# Patient Record
Sex: MALE | Employment: OTHER | URBAN - METROPOLITAN AREA
[De-identification: names, ages, dates, MRNs, and addresses within clinical notes are randomized per-mention and may not be internally consistent; named-entity substitution may affect disease eponyms.]

---

## 2018-06-14 ENCOUNTER — HOSPITAL ENCOUNTER (OUTPATIENT)
Age: 78
Discharge: HOME | End: 2018-06-16
Payer: MEDICARE

## 2018-06-14 PROCEDURE — 87077 CULTURE AEROBIC IDENTIFY: CPT

## 2018-06-14 PROCEDURE — 87075 CULTR BACTERIA EXCEPT BLOOD: CPT

## 2018-06-14 PROCEDURE — 87186 SC STD MICRODIL/AGAR DIL: CPT

## 2018-06-14 PROCEDURE — 87070 CULTURE OTHR SPECIMN AEROBIC: CPT

## 2018-06-16 LAB — ANAEROBIC CULTURE: NORMAL

## 2018-06-18 LAB
ORGANISM: ABNORMAL
ORGANISM: ABNORMAL
WOUND/ABSCESS: ABNORMAL

## 2023-03-06 PROBLEM — I10 HYPERTENSION: Status: ACTIVE | Noted: 2023-03-06

## 2023-03-06 PROBLEM — M16.10 HIP ARTHRITIS: Status: ACTIVE | Noted: 2023-03-06

## 2023-03-06 PROBLEM — M65.331 TRIGGER MIDDLE FINGER OF RIGHT HAND: Status: ACTIVE | Noted: 2023-03-06

## 2023-03-06 PROBLEM — M25.559 JOINT PAIN, HIP: Status: ACTIVE | Noted: 2023-03-06

## 2023-03-06 PROBLEM — H11.32 SUBCONJUNCTIVAL HEMORRHAGE OF LEFT EYE: Status: ACTIVE | Noted: 2023-03-06

## 2023-03-06 PROBLEM — E66.9 OBESITY: Status: ACTIVE | Noted: 2023-03-06

## 2023-03-06 PROBLEM — K21.9 GERD (GASTROESOPHAGEAL REFLUX DISEASE): Status: ACTIVE | Noted: 2023-03-06

## 2023-03-06 PROBLEM — R39.9 URINARY SYMPTOM OR SIGN: Status: ACTIVE | Noted: 2023-03-06

## 2023-03-06 PROBLEM — M54.50 BACK PAIN, LUMBOSACRAL: Status: ACTIVE | Noted: 2023-03-06

## 2023-03-06 PROBLEM — K21.00 REFLUX ESOPHAGITIS: Status: ACTIVE | Noted: 2023-03-06

## 2023-03-06 PROBLEM — C18.9 ADENOCARCINOMA, COLON (MULTI): Status: ACTIVE | Noted: 2023-03-06

## 2023-03-06 PROBLEM — G47.33 OBSTRUCTIVE SLEEP APNEA: Status: ACTIVE | Noted: 2023-03-06

## 2023-03-06 PROBLEM — Z98.890 HISTORY OF CARPAL TUNNEL SURGERY: Status: ACTIVE | Noted: 2023-03-06

## 2023-03-06 PROBLEM — K76.0 NONALCOHOLIC FATTY LIVER DISEASE: Status: ACTIVE | Noted: 2023-03-06

## 2023-03-06 PROBLEM — M80.051D: Status: ACTIVE | Noted: 2023-03-06

## 2023-03-06 PROBLEM — R06.02 SHORTNESS OF BREATH: Status: ACTIVE | Noted: 2023-03-06

## 2023-03-06 PROBLEM — E66.3 OVERWEIGHT WITH BODY MASS INDEX (BMI) OF 28 TO 28.9 IN ADULT: Status: ACTIVE | Noted: 2023-03-06

## 2023-03-06 PROBLEM — L60.0 INGROWN NAIL OF GREAT TOE OF RIGHT FOOT: Status: ACTIVE | Noted: 2023-03-06

## 2023-03-06 PROBLEM — K83.01 PRIMARY SCLEROSING CHOLANGITIS (CMS-HCC): Status: ACTIVE | Noted: 2023-03-06

## 2023-03-06 PROBLEM — G56.01 CARPAL TUNNEL SYNDROME OF RIGHT WRIST: Status: ACTIVE | Noted: 2023-03-06

## 2023-03-06 PROBLEM — M54.12 CERVICAL RADICULOPATHY: Status: ACTIVE | Noted: 2023-03-06

## 2023-03-06 PROBLEM — Z85.038 PERSONAL HISTORY OF COLON CANCER: Status: ACTIVE | Noted: 2023-03-06

## 2023-03-06 PROBLEM — K51.90 ULCERATIVE COLITIS (MULTI): Status: ACTIVE | Noted: 2023-03-06

## 2023-03-06 PROBLEM — I49.3 PREMATURE VENTRICULAR CONTRACTIONS: Status: ACTIVE | Noted: 2023-03-06

## 2023-03-06 PROBLEM — D64.9 ANEMIA: Status: ACTIVE | Noted: 2023-03-06

## 2023-03-06 PROBLEM — M10.9 GOUT: Status: ACTIVE | Noted: 2023-03-06

## 2023-03-06 PROBLEM — K74.00 FIBROSIS OF LIVER: Status: ACTIVE | Noted: 2023-03-06

## 2023-03-06 PROBLEM — J32.9 CHRONIC SINUSITIS: Status: ACTIVE | Noted: 2023-03-06

## 2023-03-06 PROBLEM — G62.9 NEUROPATHY: Status: ACTIVE | Noted: 2023-03-06

## 2023-03-06 PROBLEM — I50.22 CHRONIC SYSTOLIC CONGESTIVE HEART FAILURE (MULTI): Status: ACTIVE | Noted: 2023-03-06

## 2023-03-06 PROBLEM — M53.3 SACRAL PAIN: Status: ACTIVE | Noted: 2023-03-06

## 2023-03-06 PROBLEM — E83.42 HYPOMAGNESEMIA: Status: ACTIVE | Noted: 2023-03-06

## 2023-03-06 PROBLEM — R42 DIZZINESS: Status: ACTIVE | Noted: 2023-03-06

## 2023-03-06 PROBLEM — M17.0 DEGENERATIVE ARTHRITIS OF KNEE, BILATERAL: Status: ACTIVE | Noted: 2023-03-06

## 2023-03-06 PROBLEM — I48.91 ATRIAL FIBRILLATION (MULTI): Status: ACTIVE | Noted: 2023-03-06

## 2023-03-06 PROBLEM — I48.92 ATRIAL FLUTTER (MULTI): Status: ACTIVE | Noted: 2023-03-06

## 2023-03-06 PROBLEM — M79.671 RIGHT FOOT PAIN: Status: ACTIVE | Noted: 2023-03-06

## 2023-03-06 RX ORDER — OMEPRAZOLE 20 MG/1
1 CAPSULE, DELAYED RELEASE ORAL DAILY
COMMUNITY
Start: 2020-07-08 | End: 2023-05-05 | Stop reason: SDUPTHER

## 2023-03-06 RX ORDER — SPIRONOLACTONE 25 MG/1
0.5 TABLET ORAL DAILY
COMMUNITY
Start: 2015-09-01 | End: 2024-04-23 | Stop reason: SDUPTHER

## 2023-03-06 RX ORDER — LISINOPRIL 20 MG/1
1 TABLET ORAL DAILY
COMMUNITY
Start: 2012-09-17 | End: 2024-01-29 | Stop reason: SDUPTHER

## 2023-03-06 RX ORDER — METOPROLOL SUCCINATE 100 MG/1
1 TABLET, EXTENDED RELEASE ORAL DAILY
COMMUNITY
Start: 2016-03-17 | End: 2023-10-27 | Stop reason: SDUPTHER

## 2023-03-06 RX ORDER — DORZOLAMIDE HCL 20 MG/ML
1 SOLUTION/ DROPS OPHTHALMIC 2 TIMES DAILY
COMMUNITY
Start: 2021-08-13

## 2023-03-06 RX ORDER — ALLOPURINOL 100 MG/1
1 TABLET ORAL
COMMUNITY
Start: 2022-02-22 | End: 2024-01-03 | Stop reason: SDUPTHER

## 2023-03-06 RX ORDER — PROBENECID AND COLCHICINE .5; 5 MG/1; MG/1
1 TABLET ORAL DAILY
COMMUNITY
Start: 2013-05-13 | End: 2023-03-08 | Stop reason: SDUPTHER

## 2023-03-06 RX ORDER — MESALAMINE 0.38 G/1
4 CAPSULE, EXTENDED RELEASE ORAL EVERY MORNING
COMMUNITY
Start: 2016-04-27

## 2023-03-08 ENCOUNTER — OFFICE VISIT (OUTPATIENT)
Dept: PRIMARY CARE | Facility: CLINIC | Age: 83
End: 2023-03-08
Payer: COMMERCIAL

## 2023-03-08 VITALS
BODY MASS INDEX: 27.03 KG/M2 | OXYGEN SATURATION: 98 % | HEART RATE: 52 BPM | TEMPERATURE: 97.3 F | DIASTOLIC BLOOD PRESSURE: 62 MMHG | WEIGHT: 172.6 LBS | SYSTOLIC BLOOD PRESSURE: 120 MMHG

## 2023-03-08 DIAGNOSIS — E66.3 OVERWEIGHT WITH BODY MASS INDEX (BMI) OF 28 TO 28.9 IN ADULT: ICD-10-CM

## 2023-03-08 DIAGNOSIS — Z00.00 MEDICARE ANNUAL WELLNESS VISIT, SUBSEQUENT: ICD-10-CM

## 2023-03-08 DIAGNOSIS — D64.9 ANEMIA, UNSPECIFIED TYPE: ICD-10-CM

## 2023-03-08 DIAGNOSIS — M54.50 BACK PAIN, LUMBOSACRAL: Primary | ICD-10-CM

## 2023-03-08 DIAGNOSIS — R31.9 HEMATURIA, UNSPECIFIED TYPE: ICD-10-CM

## 2023-03-08 DIAGNOSIS — Z00.00 ROUTINE GENERAL MEDICAL EXAMINATION AT HEALTH CARE FACILITY: ICD-10-CM

## 2023-03-08 DIAGNOSIS — K21.9 GASTROESOPHAGEAL REFLUX DISEASE WITHOUT ESOPHAGITIS: ICD-10-CM

## 2023-03-08 DIAGNOSIS — T56.0X1D LEAD-INDUCED ACUTE GOUT, UNSPECIFIED SITE, SUBSEQUENT ENCOUNTER: ICD-10-CM

## 2023-03-08 DIAGNOSIS — C18.9 ADENOCARCINOMA, COLON (MULTI): ICD-10-CM

## 2023-03-08 DIAGNOSIS — M10.10 LEAD-INDUCED ACUTE GOUT, UNSPECIFIED SITE, SUBSEQUENT ENCOUNTER: ICD-10-CM

## 2023-03-08 PROBLEM — M53.3 SACRAL PAIN: Status: RESOLVED | Noted: 2023-03-06 | Resolved: 2023-03-08

## 2023-03-08 PROCEDURE — G0444 DEPRESSION SCREEN ANNUAL: HCPCS | Performed by: INTERNAL MEDICINE

## 2023-03-08 PROCEDURE — 1160F RVW MEDS BY RX/DR IN RCRD: CPT | Performed by: INTERNAL MEDICINE

## 2023-03-08 PROCEDURE — 1036F TOBACCO NON-USER: CPT | Performed by: INTERNAL MEDICINE

## 2023-03-08 PROCEDURE — G0439 PPPS, SUBSEQ VISIT: HCPCS | Performed by: INTERNAL MEDICINE

## 2023-03-08 PROCEDURE — 99214 OFFICE O/P EST MOD 30 MIN: CPT | Performed by: INTERNAL MEDICINE

## 2023-03-08 PROCEDURE — 3078F DIAST BP <80 MM HG: CPT | Performed by: INTERNAL MEDICINE

## 2023-03-08 PROCEDURE — 3074F SYST BP LT 130 MM HG: CPT | Performed by: INTERNAL MEDICINE

## 2023-03-08 PROCEDURE — 1170F FXNL STATUS ASSESSED: CPT | Performed by: INTERNAL MEDICINE

## 2023-03-08 PROCEDURE — 1159F MED LIST DOCD IN RCRD: CPT | Performed by: INTERNAL MEDICINE

## 2023-03-08 RX ORDER — PROBENECID AND COLCHICINE .5; 5 MG/1; MG/1
1 TABLET ORAL DAILY
Qty: 90 TABLET | Refills: 1 | Status: SHIPPED | OUTPATIENT
Start: 2023-03-08

## 2023-03-08 ASSESSMENT — ENCOUNTER SYMPTOMS
COUGH: 0
BRUISES/BLEEDS EASILY: 0
ARTHRALGIAS: 0
PALPITATIONS: 0
DIZZINESS: 0
SINUS PAIN: 0
FEVER: 0
SORE THROAT: 0
ABDOMINAL PAIN: 0
UNEXPECTED WEIGHT CHANGE: 0
BLOOD IN STOOL: 0
FATIGUE: 0
HEADACHES: 0
DEPRESSION: 1
DIFFICULTY URINATING: 0
LOSS OF SENSATION IN FEET: 1
DIARRHEA: 0
OCCASIONAL FEELINGS OF UNSTEADINESS: 1
DYSURIA: 1
WHEEZING: 0

## 2023-03-08 ASSESSMENT — ACTIVITIES OF DAILY LIVING (ADL)
MANAGING_FINANCES: NEEDS ASSISTANCE
DOING_HOUSEWORK: NEEDS ASSISTANCE
TAKING_MEDICATION: INDEPENDENT
GROCERY_SHOPPING: NEEDS ASSISTANCE
BATHING: INDEPENDENT
DRESSING: INDEPENDENT

## 2023-03-08 ASSESSMENT — PATIENT HEALTH QUESTIONNAIRE - PHQ9
2. FEELING DOWN, DEPRESSED OR HOPELESS: MORE THAN HALF THE DAYS
3. TROUBLE FALLING OR STAYING ASLEEP OR SLEEPING TOO MUCH: NOT AT ALL
9. THOUGHTS THAT YOU WOULD BE BETTER OFF DEAD, OR OF HURTING YOURSELF: NOT AT ALL
1. LITTLE INTEREST OR PLEASURE IN DOING THINGS: MORE THAN HALF THE DAYS
5. POOR APPETITE OR OVEREATING: NEARLY EVERY DAY
8. MOVING OR SPEAKING SO SLOWLY THAT OTHER PEOPLE COULD HAVE NOTICED. OR THE OPPOSITE, BEING SO FIGETY OR RESTLESS THAT YOU HAVE BEEN MOVING AROUND A LOT MORE THAN USUAL: NOT AT ALL
SUM OF ALL RESPONSES TO PHQ9 QUESTIONS 1 AND 2: 4
SUM OF ALL RESPONSES TO PHQ QUESTIONS 1-9: 8
7. TROUBLE CONCENTRATING ON THINGS, SUCH AS READING THE NEWSPAPER OR WATCHING TELEVISION: NOT AT ALL
6. FEELING BAD ABOUT YOURSELF - OR THAT YOU ARE A FAILURE OR HAVE LET YOURSELF OR YOUR FAMILY DOWN: NOT AT ALL
4. FEELING TIRED OR HAVING LITTLE ENERGY: SEVERAL DAYS

## 2023-03-08 NOTE — PROGRESS NOTES
Subjective   Reason for Visit: Guero Boykin is an 82 y.o. male here for a Medicare Wellness visit.          Reviewed all medications by prescribing practitioner or clinical pharmacist (such as prescriptions, OTCs, herbal therapies and supplements) and documented in the medical record.    -I spent 15 minutes obtaining and discussing depression screening using PHQ-2 questions with results documented in the chart.  -Urinary symptoms possible old hematuria need to obtain urine analysis and culture follow-up results closely        Patient Self Assessment of Health Status  Patient Self Assessment: Fair    Nutrition and Exercise  Current Diet: Unhealthy Diet  Adequate Fluid Intake: No  Caffeine: Yes  Exercise Frequency: Infrequently    Functional Ability/Level of Safety  Cognitive Impairment Observed: No cognitive impairment observed  Cognitive Impairment Reported: No cognitive impairment reported by patient or family    Home Safety Risk Factors: None    Patient Care Team:  Margaret Barr MD as PCP - General  Margaret Barr MD as PCP - [employer] PCP     Review of Systems   Constitutional:  Negative for fatigue, fever and unexpected weight change.   HENT:  Negative for congestion, ear discharge, ear pain, mouth sores, sinus pain and sore throat.    Eyes:  Negative for visual disturbance.   Respiratory:  Negative for cough and wheezing.    Cardiovascular:  Negative for chest pain, palpitations and leg swelling.   Gastrointestinal:  Negative for abdominal pain, blood in stool and diarrhea.   Genitourinary:  Positive for dysuria. Negative for difficulty urinating.   Musculoskeletal:  Negative for arthralgias.   Skin:  Negative for rash.   Neurological:  Negative for dizziness and headaches.   Hematological:  Does not bruise/bleed easily.   Psychiatric/Behavioral:  Negative for behavioral problems.        Objective   Vitals:  /62   Pulse 52   Temp 36.3 °C (97.3 °F)   Wt 78.3 kg (172 lb 9.6 oz)    SpO2 98%   BMI 27.03 kg/m²       Physical Exam  Constitutional:       Appearance: Normal appearance.   HENT:      Head: Normocephalic.      Nose: Nose normal.   Eyes:      Conjunctiva/sclera: Conjunctivae normal.      Pupils: Pupils are equal, round, and reactive to light.   Cardiovascular:      Rate and Rhythm: Regular rhythm.   Pulmonary:      Effort: Pulmonary effort is normal.      Breath sounds: Normal breath sounds.   Abdominal:      General: Abdomen is flat.      Palpations: Abdomen is soft.   Musculoskeletal:      Cervical back: Neck supple.   Skin:     General: Skin is warm.   Neurological:      General: No focal deficit present.      Mental Status: He is alert and oriented to person, place, and time.   Psychiatric:         Mood and Affect: Mood normal.         Assessment/Plan   Problem List Items Addressed This Visit          Digestive    Adenocarcinoma, colon (CMS/HCC)    Current Assessment & Plan     In remission no recurrence         GERD (gastroesophageal reflux disease)       Endocrine/Metabolic    Overweight with body mass index (BMI) of 28 to 28.9 in adult    Current Assessment & Plan     Counseled about obesity low-carb diet increase activity            Hematologic    Anemia    Current Assessment & Plan     Compensated follow-up CBC continue with multivitamins            Other    Gout    Relevant Medications    probenecid-colchicine 500-0.5 mg tablet    Back pain, lumbosacral - Primary    Current Assessment & Plan     Continue Tylenol as needed increase activity, physical therapy          Other Visit Diagnoses       Hematuria, unspecified type        Relevant Orders    Urinalysis Microscopic Only    Urine Culture    Medicare annual wellness visit, subsequent        Routine general medical examination at health care facility              -I spent 15 minutes obtaining and discussing depression screening using PHQ-2 questions with results documented in the chart.  -Urinary symptoms possible old  hematuria need to obtain urine analysis and culture follow-up results closely

## 2023-03-09 ENCOUNTER — LAB (OUTPATIENT)
Dept: LAB | Facility: LAB | Age: 83
End: 2023-03-09
Payer: COMMERCIAL

## 2023-03-09 DIAGNOSIS — R31.9 HEMATURIA, UNSPECIFIED TYPE: ICD-10-CM

## 2023-03-09 DIAGNOSIS — R31.9 URINARY TRACT INFECTION WITH HEMATURIA, SITE UNSPECIFIED: Primary | ICD-10-CM

## 2023-03-09 DIAGNOSIS — N39.0 URINARY TRACT INFECTION WITH HEMATURIA, SITE UNSPECIFIED: Primary | ICD-10-CM

## 2023-03-09 PROCEDURE — 81001 URINALYSIS AUTO W/SCOPE: CPT

## 2023-03-09 PROCEDURE — 87086 URINE CULTURE/COLONY COUNT: CPT

## 2023-03-09 PROCEDURE — 87077 CULTURE AEROBIC IDENTIFY: CPT

## 2023-03-09 PROCEDURE — 87186 SC STD MICRODIL/AGAR DIL: CPT

## 2023-03-10 LAB
BACTERIA, URINE: ABNORMAL /HPF
HYALINE CASTS, URINE: ABNORMAL /LPF
MUCUS, URINE: ABNORMAL /LPF
RBC, URINE: 5 /HPF (ref 0–5)
SQUAMOUS EPITHELIAL CELLS, URINE: <1 /HPF
WBC CLUMPS, URINE: ABNORMAL /HPF
WBC, URINE: 78 /HPF (ref 0–5)

## 2023-03-12 LAB — URINE CULTURE: ABNORMAL

## 2023-03-12 RX ORDER — AMOXICILLIN AND CLAVULANATE POTASSIUM 875; 125 MG/1; MG/1
875 TABLET, FILM COATED ORAL
Qty: 20 TABLET | Refills: 0 | Status: SHIPPED | OUTPATIENT
Start: 2023-03-12 | End: 2023-03-22

## 2023-05-05 DIAGNOSIS — K21.9 GASTROESOPHAGEAL REFLUX DISEASE WITHOUT ESOPHAGITIS: ICD-10-CM

## 2023-05-07 RX ORDER — OMEPRAZOLE 20 MG/1
20 CAPSULE, DELAYED RELEASE ORAL DAILY
Qty: 30 CAPSULE | Refills: 0 | Status: SHIPPED | OUTPATIENT
Start: 2023-05-07 | End: 2023-08-23 | Stop reason: SDUPTHER

## 2023-06-08 ENCOUNTER — OFFICE VISIT (OUTPATIENT)
Dept: PRIMARY CARE | Facility: CLINIC | Age: 83
End: 2023-06-08
Payer: COMMERCIAL

## 2023-06-08 VITALS
WEIGHT: 168.6 LBS | DIASTOLIC BLOOD PRESSURE: 84 MMHG | OXYGEN SATURATION: 100 % | BODY MASS INDEX: 27.1 KG/M2 | HEART RATE: 83 BPM | TEMPERATURE: 97 F | HEIGHT: 66 IN | SYSTOLIC BLOOD PRESSURE: 132 MMHG

## 2023-06-08 DIAGNOSIS — I48.92 ATRIAL FLUTTER, UNSPECIFIED TYPE (MULTI): ICD-10-CM

## 2023-06-08 DIAGNOSIS — Z00.00 ROUTINE GENERAL MEDICAL EXAMINATION AT HEALTH CARE FACILITY: ICD-10-CM

## 2023-06-08 DIAGNOSIS — R06.02 SHORTNESS OF BREATH: ICD-10-CM

## 2023-06-08 DIAGNOSIS — E55.9 VITAMIN D DEFICIENCY, UNSPECIFIED: ICD-10-CM

## 2023-06-08 DIAGNOSIS — E78.00 HYPERCHOLESTEREMIA: ICD-10-CM

## 2023-06-08 DIAGNOSIS — K51.219 ULCERATIVE PROCTITIS WITH COMPLICATION (MULTI): ICD-10-CM

## 2023-06-08 DIAGNOSIS — K21.00 GASTROESOPHAGEAL REFLUX DISEASE WITH ESOPHAGITIS WITHOUT HEMORRHAGE: ICD-10-CM

## 2023-06-08 DIAGNOSIS — F10.20 UNCOMPLICATED ALCOHOL DEPENDENCE (MULTI): ICD-10-CM

## 2023-06-08 DIAGNOSIS — I48.11 LONGSTANDING PERSISTENT ATRIAL FIBRILLATION (MULTI): ICD-10-CM

## 2023-06-08 DIAGNOSIS — I10 HYPERTENSION, UNSPECIFIED TYPE: Primary | ICD-10-CM

## 2023-06-08 DIAGNOSIS — I50.22 CHRONIC SYSTOLIC CONGESTIVE HEART FAILURE (MULTI): ICD-10-CM

## 2023-06-08 DIAGNOSIS — Z13.89 SCREENING FOR MULTIPLE CONDITIONS: ICD-10-CM

## 2023-06-08 DIAGNOSIS — E53.8 VITAMIN B12 DEFICIENCY: ICD-10-CM

## 2023-06-08 DIAGNOSIS — R53.83 OTHER FATIGUE: ICD-10-CM

## 2023-06-08 DIAGNOSIS — Z79.899 HIGH RISK MEDICATION USE: ICD-10-CM

## 2023-06-08 PROBLEM — Z98.890 HISTORY OF CARPAL TUNNEL SURGERY: Status: RESOLVED | Noted: 2023-03-06 | Resolved: 2023-06-08

## 2023-06-08 PROBLEM — G62.9 NEUROPATHY: Status: RESOLVED | Noted: 2023-03-06 | Resolved: 2023-06-08

## 2023-06-08 PROBLEM — M65.331 TRIGGER MIDDLE FINGER OF RIGHT HAND: Status: RESOLVED | Noted: 2023-03-06 | Resolved: 2023-06-08

## 2023-06-08 PROBLEM — E66.9 OBESITY: Status: RESOLVED | Noted: 2023-03-06 | Resolved: 2023-06-08

## 2023-06-08 PROBLEM — L60.0 INGROWN NAIL OF GREAT TOE OF RIGHT FOOT: Status: RESOLVED | Noted: 2023-03-06 | Resolved: 2023-06-08

## 2023-06-08 PROBLEM — M80.051D: Status: RESOLVED | Noted: 2023-03-06 | Resolved: 2023-06-08

## 2023-06-08 PROBLEM — K83.01 PRIMARY SCLEROSING CHOLANGITIS (CMS-HCC): Status: RESOLVED | Noted: 2023-03-06 | Resolved: 2023-06-08

## 2023-06-08 PROBLEM — M79.671 RIGHT FOOT PAIN: Status: RESOLVED | Noted: 2023-03-06 | Resolved: 2023-06-08

## 2023-06-08 PROBLEM — M25.559 JOINT PAIN, HIP: Status: RESOLVED | Noted: 2023-03-06 | Resolved: 2023-06-08

## 2023-06-08 PROBLEM — K76.0 NONALCOHOLIC FATTY LIVER DISEASE: Status: RESOLVED | Noted: 2023-03-06 | Resolved: 2023-06-08

## 2023-06-08 PROCEDURE — 1159F MED LIST DOCD IN RCRD: CPT | Performed by: INTERNAL MEDICINE

## 2023-06-08 PROCEDURE — 1170F FXNL STATUS ASSESSED: CPT | Performed by: INTERNAL MEDICINE

## 2023-06-08 PROCEDURE — G0442 ANNUAL ALCOHOL SCREEN 15 MIN: HCPCS | Performed by: INTERNAL MEDICINE

## 2023-06-08 PROCEDURE — 1160F RVW MEDS BY RX/DR IN RCRD: CPT | Performed by: INTERNAL MEDICINE

## 2023-06-08 PROCEDURE — 3075F SYST BP GE 130 - 139MM HG: CPT | Performed by: INTERNAL MEDICINE

## 2023-06-08 PROCEDURE — 99397 PER PM REEVAL EST PAT 65+ YR: CPT | Performed by: INTERNAL MEDICINE

## 2023-06-08 PROCEDURE — 1036F TOBACCO NON-USER: CPT | Performed by: INTERNAL MEDICINE

## 2023-06-08 PROCEDURE — 3079F DIAST BP 80-89 MM HG: CPT | Performed by: INTERNAL MEDICINE

## 2023-06-08 PROCEDURE — 99214 OFFICE O/P EST MOD 30 MIN: CPT | Performed by: INTERNAL MEDICINE

## 2023-06-08 ASSESSMENT — PATIENT HEALTH QUESTIONNAIRE - PHQ9
8. MOVING OR SPEAKING SO SLOWLY THAT OTHER PEOPLE COULD HAVE NOTICED. OR THE OPPOSITE, BEING SO FIGETY OR RESTLESS THAT YOU HAVE BEEN MOVING AROUND A LOT MORE THAN USUAL: NOT AT ALL
SUM OF ALL RESPONSES TO PHQ9 QUESTIONS 1 AND 2: 2
7. TROUBLE CONCENTRATING ON THINGS, SUCH AS READING THE NEWSPAPER OR WATCHING TELEVISION: NOT AT ALL
4. FEELING TIRED OR HAVING LITTLE ENERGY: MORE THAN HALF THE DAYS
SUM OF ALL RESPONSES TO PHQ QUESTIONS 1-9: 4
9. THOUGHTS THAT YOU WOULD BE BETTER OFF DEAD, OR OF HURTING YOURSELF: NOT AT ALL
6. FEELING BAD ABOUT YOURSELF - OR THAT YOU ARE A FAILURE OR HAVE LET YOURSELF OR YOUR FAMILY DOWN: NOT AT ALL
2. FEELING DOWN, DEPRESSED OR HOPELESS: NOT AT ALL
10. IF YOU CHECKED OFF ANY PROBLEMS, HOW DIFFICULT HAVE THESE PROBLEMS MADE IT FOR YOU TO DO YOUR WORK, TAKE CARE OF THINGS AT HOME, OR GET ALONG WITH OTHER PEOPLE: NOT DIFFICULT AT ALL
1. LITTLE INTEREST OR PLEASURE IN DOING THINGS: MORE THAN HALF THE DAYS
3. TROUBLE FALLING OR STAYING ASLEEP OR SLEEPING TOO MUCH: NOT AT ALL
2. FEELING DOWN, DEPRESSED OR HOPELESS: NOT AT ALL
5. POOR APPETITE OR OVEREATING: NOT AT ALL

## 2023-06-08 ASSESSMENT — ENCOUNTER SYMPTOMS
DIZZINESS: 0
SINUS PAIN: 0
OCCASIONAL FEELINGS OF UNSTEADINESS: 1
ABDOMINAL PAIN: 0
LOSS OF SENSATION IN FEET: 1
PALPITATIONS: 0
DIARRHEA: 0
SORE THROAT: 0
FEVER: 0
FATIGUE: 0
ARTHRALGIAS: 0
BRUISES/BLEEDS EASILY: 0
COUGH: 0
DIFFICULTY URINATING: 0
BLOOD IN STOOL: 0
DEPRESSION: 0
HEADACHES: 0
WHEEZING: 0
HYPERTENSION: 1
UNEXPECTED WEIGHT CHANGE: 0

## 2023-06-08 ASSESSMENT — ACTIVITIES OF DAILY LIVING (ADL)
MANAGING_FINANCES: NEEDS ASSISTANCE
GROCERY_SHOPPING: INDEPENDENT
TAKING_MEDICATION: INDEPENDENT
BATHING: INDEPENDENT
DOING_HOUSEWORK: NEEDS ASSISTANCE
DRESSING: INDEPENDENT

## 2023-06-08 NOTE — PROGRESS NOTES
Subjective   Reason for Visit: Guero Boykin is an 82 y.o. male here for a Medicare Wellness visit.     Past Medical, Surgical, and Family History reviewed and updated in chart.    Reviewed all medications by prescribing practitioner or clinical pharmacist (such as prescriptions, OTCs, herbal therapies and supplements) and documented in the medical record.    Annual preventive visit  -Screening for colon cancer not needed asymptomatic  -Vaccinations up-to-date  -Screening for alcohol patient drinks 14 beers a day  Patient counseled about alcohol abstinence counseled about risk and complications. Closely.  -Needs to complete blood work  -Colon cancer in remission 3 years ago status post revision of the colostomy doing very well..    Medicare screening reviewed with patient counseled about alcohol    Follow-up  -Hypomagnesemia controlled needs to follow up electrolytes today follow-up for further recommendation  -History of colon cancer in remission.  -Hypertension controlled to continue with lisinopril   -paroxysmal atrial fibrillation patient on eliqis , is well controlled, we will continue with current medication.  -Obstructive sleep apnea compensated.  -Borderline hypercholesterolemia continues diet controlled,  Follow-up 3 months      Atrial Fibrillation  Symptoms are negative for chest pain, dizziness and palpitations. Past medical history includes atrial fibrillation.   GERD  He reports no abdominal pain, no chest pain, no coughing, no sore throat or no wheezing. Pertinent negatives include no fatigue.   Hypertension  Pertinent negatives include no chest pain, headaches or palpitations.       Patient Care Team:  Margaret Barr MD as PCP - General  Margaret Barr MD as PCP - [employer] PCP     Review of Systems   Constitutional:  Negative for fatigue, fever and unexpected weight change.   HENT:  Negative for congestion, ear discharge, ear pain, mouth sores, sinus pain and sore throat.   "  Eyes:  Negative for visual disturbance.   Respiratory:  Negative for cough and wheezing.    Cardiovascular:  Negative for chest pain, palpitations and leg swelling.   Gastrointestinal:  Negative for abdominal pain, blood in stool and diarrhea.   Genitourinary:  Negative for difficulty urinating.   Musculoskeletal:  Negative for arthralgias.   Skin:  Negative for rash.   Neurological:  Negative for dizziness and headaches.   Hematological:  Does not bruise/bleed easily.   Psychiatric/Behavioral:  Negative for behavioral problems.    All other systems reviewed and are negative.      Objective   Vitals:  /84   Pulse 83   Temp 36.1 °C (97 °F)   Ht 1.664 m (5' 5.5\")   Wt 76.5 kg (168 lb 9.6 oz)   SpO2 100%   BMI 27.63 kg/m²       Physical Exam  Vitals and nursing note reviewed.   Constitutional:       Appearance: Normal appearance.   HENT:      Head: Normocephalic.      Nose: Nose normal.   Eyes:      Conjunctiva/sclera: Conjunctivae normal.      Pupils: Pupils are equal, round, and reactive to light.   Cardiovascular:      Rate and Rhythm: Regular rhythm.   Pulmonary:      Effort: Pulmonary effort is normal.      Breath sounds: Normal breath sounds.   Abdominal:      General: Abdomen is flat.      Palpations: Abdomen is soft.   Musculoskeletal:      Cervical back: Neck supple.   Skin:     General: Skin is warm.   Neurological:      General: No focal deficit present.      Mental Status: He is oriented to person, place, and time.   Psychiatric:         Mood and Affect: Mood normal.         Assessment/Plan   Problem List Items Addressed This Visit          Respiratory    RESOLVED: Shortness of breath       Circulatory    Atrial fibrillation (CMS/HCC)    Atrial flutter (CMS/HCC)    Chronic systolic congestive heart failure (CMS/HCC)    Hypertension - Primary    Relevant Orders    Comprehensive Metabolic Panel       Digestive    Reflux esophagitis    Ulcerative colitis (CMS/HCC)    Overview     Chronic " Condition Documentation: Improving based on symptoms and exam. Continue established treatment plan and follow-up at least yearly. Additional Details:             Other    Uncomplicated alcohol dependence (CMS/HCC)     Other Visit Diagnoses       Screening for multiple conditions        Routine general medical examination at health care facility        High risk medication use        Relevant Orders    CBC and Auto Differential    Hypercholesteremia        Relevant Orders    Lipid Panel    Other fatigue        Relevant Orders    TSH with reflex to Free T4 if abnormal    Vitamin B12 deficiency        Relevant Orders    Vitamin B12    Vitamin D deficiency, unspecified        Relevant Orders    Vitamin D, Total          Annual preventive visit  -Screening for colon cancer not needed asymptomatic  -Vaccinations up-to-date  -Screening for alcohol patient drinks 14 beers a day  Patient counseled about alcohol abstinence counseled about risk and complications. Closely.  -Needs to complete blood work  -Colon cancer in remission 3 years ago status post revision of the colostomy doing very well..    Medicare screening reviewed with patient counseled about alcohol    Follow-up  -Hypomagnesemia controlled needs to follow up electrolytes today follow-up for further recommendation  -History of colon cancer in remission.  -Hypertension controlled to continue with lisinopril   -paroxysmal atrial fibrillation patient on eliqis , is well controlled, we will continue with current medication.  -Obstructive sleep apnea compensated.  -Borderline hypercholesterolemia continues diet controlled,  Follow-up 3 months

## 2023-08-01 ENCOUNTER — OFFICE VISIT (OUTPATIENT)
Dept: PRIMARY CARE | Facility: CLINIC | Age: 83
End: 2023-08-01
Payer: COMMERCIAL

## 2023-08-01 VITALS
DIASTOLIC BLOOD PRESSURE: 60 MMHG | SYSTOLIC BLOOD PRESSURE: 118 MMHG | TEMPERATURE: 97.8 F | WEIGHT: 158 LBS | HEART RATE: 59 BPM | BODY MASS INDEX: 25.39 KG/M2 | HEIGHT: 66 IN | OXYGEN SATURATION: 99 %

## 2023-08-01 DIAGNOSIS — C18.9 ADENOCARCINOMA, COLON (MULTI): ICD-10-CM

## 2023-08-01 DIAGNOSIS — I48.11 LONGSTANDING PERSISTENT ATRIAL FIBRILLATION (MULTI): ICD-10-CM

## 2023-08-01 DIAGNOSIS — I10 HYPERTENSION, UNSPECIFIED TYPE: ICD-10-CM

## 2023-08-01 DIAGNOSIS — K59.09 CHRONIC CONSTIPATION: Primary | ICD-10-CM

## 2023-08-01 DIAGNOSIS — E78.00 HYPERCHOLESTEREMIA: ICD-10-CM

## 2023-08-01 PROCEDURE — 99214 OFFICE O/P EST MOD 30 MIN: CPT | Performed by: INTERNAL MEDICINE

## 2023-08-01 PROCEDURE — 3078F DIAST BP <80 MM HG: CPT | Performed by: INTERNAL MEDICINE

## 2023-08-01 PROCEDURE — 1159F MED LIST DOCD IN RCRD: CPT | Performed by: INTERNAL MEDICINE

## 2023-08-01 PROCEDURE — 1160F RVW MEDS BY RX/DR IN RCRD: CPT | Performed by: INTERNAL MEDICINE

## 2023-08-01 PROCEDURE — 3074F SYST BP LT 130 MM HG: CPT | Performed by: INTERNAL MEDICINE

## 2023-08-01 PROCEDURE — 1036F TOBACCO NON-USER: CPT | Performed by: INTERNAL MEDICINE

## 2023-08-01 ASSESSMENT — PATIENT HEALTH QUESTIONNAIRE - PHQ9
2. FEELING DOWN, DEPRESSED OR HOPELESS: NOT AT ALL
1. LITTLE INTEREST OR PLEASURE IN DOING THINGS: NOT AT ALL
SUM OF ALL RESPONSES TO PHQ9 QUESTIONS 1 AND 2: 0

## 2023-08-01 ASSESSMENT — ENCOUNTER SYMPTOMS
LOSS OF SENSATION IN FEET: 0
DEPRESSION: 0
FEVER: 0
SORE THROAT: 0
BLOOD IN STOOL: 0
SINUS PAIN: 0
COUGH: 0
BRUISES/BLEEDS EASILY: 0
ABDOMINAL PAIN: 0
WHEEZING: 0
CONSTIPATION: 1
ARTHRALGIAS: 0
HEADACHES: 0
FATIGUE: 0
PALPITATIONS: 0
UNEXPECTED WEIGHT CHANGE: 0
DIFFICULTY URINATING: 0
DIARRHEA: 0
DIZZINESS: 0
OCCASIONAL FEELINGS OF UNSTEADINESS: 0

## 2023-08-23 DIAGNOSIS — K21.9 GASTROESOPHAGEAL REFLUX DISEASE WITHOUT ESOPHAGITIS: ICD-10-CM

## 2023-08-23 RX ORDER — OMEPRAZOLE 20 MG/1
20 CAPSULE, DELAYED RELEASE ORAL DAILY
Qty: 90 CAPSULE | Refills: 1 | Status: SHIPPED | OUTPATIENT
Start: 2023-08-23 | End: 2024-01-25 | Stop reason: SDUPTHER

## 2023-08-29 LAB
ALANINE AMINOTRANSFERASE (SGPT) (U/L) IN SER/PLAS: 72 U/L (ref 10–52)
ALBUMIN (G/DL) IN SER/PLAS: 3.8 G/DL (ref 3.4–5)
ALKALINE PHOSPHATASE (U/L) IN SER/PLAS: 268 U/L (ref 33–136)
ANION GAP IN SER/PLAS: 14 MMOL/L (ref 10–20)
ASPARTATE AMINOTRANSFERASE (SGOT) (U/L) IN SER/PLAS: 45 U/L (ref 9–39)
BILIRUBIN TOTAL (MG/DL) IN SER/PLAS: 1 MG/DL (ref 0–1.2)
CALCIUM (MG/DL) IN SER/PLAS: 9.6 MG/DL (ref 8.6–10.6)
CARBON DIOXIDE, TOTAL (MMOL/L) IN SER/PLAS: 25 MMOL/L (ref 21–32)
CHLORIDE (MMOL/L) IN SER/PLAS: 105 MMOL/L (ref 98–107)
CREATININE (MG/DL) IN SER/PLAS: 0.81 MG/DL (ref 0.5–1.3)
ERYTHROCYTE DISTRIBUTION WIDTH (RATIO) BY AUTOMATED COUNT: 13.4 % (ref 11.5–14.5)
ERYTHROCYTE MEAN CORPUSCULAR HEMOGLOBIN CONCENTRATION (G/DL) BY AUTOMATED: 33 G/DL (ref 32–36)
ERYTHROCYTE MEAN CORPUSCULAR VOLUME (FL) BY AUTOMATED COUNT: 100 FL (ref 80–100)
ERYTHROCYTES (10*6/UL) IN BLOOD BY AUTOMATED COUNT: 3.96 X10E12/L (ref 4.5–5.9)
GFR MALE: 88 ML/MIN/1.73M2
GLUCOSE (MG/DL) IN SER/PLAS: 97 MG/DL (ref 74–99)
HEMATOCRIT (%) IN BLOOD BY AUTOMATED COUNT: 39.7 % (ref 41–52)
HEMOGLOBIN (G/DL) IN BLOOD: 13.1 G/DL (ref 13.5–17.5)
LEUKOCYTES (10*3/UL) IN BLOOD BY AUTOMATED COUNT: 5.9 X10E9/L (ref 4.4–11.3)
NRBC (PER 100 WBCS) BY AUTOMATED COUNT: 0 /100 WBC (ref 0–0)
PLATELETS (10*3/UL) IN BLOOD AUTOMATED COUNT: 179 X10E9/L (ref 150–450)
POTASSIUM (MMOL/L) IN SER/PLAS: 4.6 MMOL/L (ref 3.5–5.3)
PROTEIN TOTAL: 7.2 G/DL (ref 6.4–8.2)
SODIUM (MMOL/L) IN SER/PLAS: 139 MMOL/L (ref 136–145)
UREA NITROGEN (MG/DL) IN SER/PLAS: 13 MG/DL (ref 6–23)

## 2023-10-27 DIAGNOSIS — I48.3 TYPICAL ATRIAL FLUTTER (MULTI): Primary | ICD-10-CM

## 2023-10-30 RX ORDER — METOPROLOL SUCCINATE 100 MG/1
100 TABLET, EXTENDED RELEASE ORAL DAILY
Qty: 90 TABLET | Refills: 3 | Status: SHIPPED | OUTPATIENT
Start: 2023-10-30 | End: 2024-04-23 | Stop reason: SDUPTHER

## 2023-11-01 ENCOUNTER — OFFICE VISIT (OUTPATIENT)
Dept: PRIMARY CARE | Facility: CLINIC | Age: 83
End: 2023-11-01
Payer: MEDICARE

## 2023-11-01 VITALS
WEIGHT: 164 LBS | SYSTOLIC BLOOD PRESSURE: 120 MMHG | DIASTOLIC BLOOD PRESSURE: 70 MMHG | BODY MASS INDEX: 26.88 KG/M2 | OXYGEN SATURATION: 100 % | TEMPERATURE: 97 F | HEART RATE: 47 BPM

## 2023-11-01 DIAGNOSIS — E55.9 VITAMIN D DEFICIENCY, UNSPECIFIED: ICD-10-CM

## 2023-11-01 DIAGNOSIS — Z23 FLU VACCINE NEED: ICD-10-CM

## 2023-11-01 DIAGNOSIS — I10 HYPERTENSION, UNSPECIFIED TYPE: ICD-10-CM

## 2023-11-01 DIAGNOSIS — Z79.899 HIGH RISK MEDICATION USE: ICD-10-CM

## 2023-11-01 DIAGNOSIS — I48.11 LONGSTANDING PERSISTENT ATRIAL FIBRILLATION (MULTI): ICD-10-CM

## 2023-11-01 DIAGNOSIS — R53.83 OTHER FATIGUE: ICD-10-CM

## 2023-11-01 DIAGNOSIS — R91.1 NODULE OF LOWER LOBE OF RIGHT LUNG: ICD-10-CM

## 2023-11-01 DIAGNOSIS — E53.8 VITAMIN B12 DEFICIENCY: ICD-10-CM

## 2023-11-01 DIAGNOSIS — E78.00 HYPERCHOLESTEREMIA: ICD-10-CM

## 2023-11-01 DIAGNOSIS — K59.09 CHRONIC CONSTIPATION: Primary | ICD-10-CM

## 2023-11-01 PROCEDURE — 1159F MED LIST DOCD IN RCRD: CPT | Performed by: INTERNAL MEDICINE

## 2023-11-01 PROCEDURE — 1160F RVW MEDS BY RX/DR IN RCRD: CPT | Performed by: INTERNAL MEDICINE

## 2023-11-01 PROCEDURE — 1036F TOBACCO NON-USER: CPT | Performed by: INTERNAL MEDICINE

## 2023-11-01 PROCEDURE — 90662 IIV NO PRSV INCREASED AG IM: CPT | Performed by: INTERNAL MEDICINE

## 2023-11-01 PROCEDURE — 99214 OFFICE O/P EST MOD 30 MIN: CPT | Performed by: INTERNAL MEDICINE

## 2023-11-01 PROCEDURE — 3074F SYST BP LT 130 MM HG: CPT | Performed by: INTERNAL MEDICINE

## 2023-11-01 PROCEDURE — G0008 ADMIN INFLUENZA VIRUS VAC: HCPCS | Performed by: INTERNAL MEDICINE

## 2023-11-01 PROCEDURE — 3078F DIAST BP <80 MM HG: CPT | Performed by: INTERNAL MEDICINE

## 2023-11-01 ASSESSMENT — ENCOUNTER SYMPTOMS
COUGH: 0
DIFFICULTY URINATING: 0
HEADACHES: 0
HYPERTENSION: 1
DIARRHEA: 0
UNEXPECTED WEIGHT CHANGE: 0
SINUS PAIN: 0
PALPITATIONS: 0
DIZZINESS: 0
ARTHRALGIAS: 0
BLOOD IN STOOL: 0
SORE THROAT: 0
FATIGUE: 0
ABDOMINAL PAIN: 0
WHEEZING: 0
FEVER: 0
BRUISES/BLEEDS EASILY: 0

## 2023-11-01 NOTE — PROGRESS NOTES
Subjective   Patient ID: Guero Boykin is a 83 y.o. male who presents for Hypertension and Flu Vaccine (Vaccine given).     - Chronic constipation controlled to continue with MiraLAX as recommended  -Lung nodule on the right lower lobe seen on the CT scan and July abdominal CT because of patient history of colon cancer need to repeat in 6 months arrange for CT scan to be done in January 2024  - Needs to complete blood work in January  --Counseled about alcohol abstinence  -Colon cancer in remission 3 years ago status post colostomy revision follow-up gastroenterology for surveillance as recommended next months Dr. Warren   -Hypomagnesemia controlled needs to follow up electrolytes today follow-up for further recommendation  -Hypertension controlled to continue with lisinopril   -paroxysmal atrial fibrillation patient on eliqis , is well controlled, we will continue with current medication.  No palpitation no leg swelling  -Obstructive sleep apnea compensated.  -Borderline-hypercholesterolemia continue low-fat diet  - Complete blood work  Physical exam 3 months    Hypertension  Pertinent negatives include no chest pain, headaches or palpitations.          Review of Systems   Constitutional:  Negative for fatigue, fever and unexpected weight change.   HENT:  Negative for congestion, ear discharge, ear pain, mouth sores, sinus pain and sore throat.    Eyes:  Negative for visual disturbance.   Respiratory:  Negative for cough and wheezing.    Cardiovascular:  Negative for chest pain, palpitations and leg swelling.   Gastrointestinal:  Negative for abdominal pain, blood in stool and diarrhea.   Genitourinary:  Negative for difficulty urinating.   Musculoskeletal:  Negative for arthralgias.   Skin:  Negative for rash.   Neurological:  Negative for dizziness and headaches.   Hematological:  Does not bruise/bleed easily.   Psychiatric/Behavioral:  Negative for behavioral problems.    All other systems reviewed and are  "negative.      Objective   No results found for: \"HGBA1C\"   /70   Pulse (!) 47   Temp 36.1 °C (97 °F)   Wt 74.4 kg (164 lb)   SpO2 100%   BMI 26.88 kg/m²   Lab Results   Component Value Date    WBC 5.9 08/29/2023    HGB 13.1 (L) 08/29/2023    HCT 39.7 (L) 08/29/2023     08/29/2023    CHOL 170 07/12/2021    TRIG 203 (H) 07/12/2021    HDL 36.0 (A) 07/12/2021    ALT 72 (H) 08/29/2023    AST 45 (H) 08/29/2023     08/29/2023    K 4.6 08/29/2023     08/29/2023    CREATININE 0.81 08/29/2023    BUN 13 08/29/2023    CO2 25 08/29/2023    TSH 1.99 07/12/2021    INR 1.2 (H) 04/04/2022     par   Physical Exam  Vitals and nursing note reviewed.   Constitutional:       Appearance: Normal appearance.   HENT:      Head: Normocephalic.      Nose: Nose normal.   Eyes:      Conjunctiva/sclera: Conjunctivae normal.      Pupils: Pupils are equal, round, and reactive to light.   Cardiovascular:      Rate and Rhythm: Regular rhythm.   Pulmonary:      Effort: Pulmonary effort is normal.      Breath sounds: Normal breath sounds.   Abdominal:      General: Abdomen is flat.      Palpations: Abdomen is soft.   Musculoskeletal:      Cervical back: Neck supple.   Skin:     General: Skin is warm.   Neurological:      General: No focal deficit present.      Mental Status: He is oriented to person, place, and time.   Psychiatric:         Mood and Affect: Mood normal.         Assessment/Plan   Guero was seen today for hypertension and flu vaccine.  Diagnoses and all orders for this visit:  Chronic constipation (Primary)  Longstanding persistent atrial fibrillation (CMS/HCC)  Nodule of lower lobe of right lung  -     CT chest wo IV contrast; Future  Flu vaccine need  -     Flu vaccine, quadrivalent, high-dose, preservative free, age 65y+ (FLUZONE)  High risk medication use  -     CBC and Auto Differential; Future  Hypertension, unspecified type  -     Comprehensive Metabolic Panel; Future  Hypercholesteremia  -     Lipid " Panel; Future  Other fatigue  -     TSH with reflex to Free T4 if abnormal; Future  Vitamin B12 deficiency  -     Vitamin B12; Future  Vitamin D deficiency, unspecified  -     Vitamin D 25-Hydroxy,Total (for eval of Vitamin D levels); Future    - Chronic constipation controlled to continue with MiraLAX as recommended  -Lung nodule on the right lower lobe seen on the CT scan and July abdominal CT because of patient history of colon cancer need to repeat in 6 months arrange for CT scan to be done in January 2024  - Needs to complete blood work in January  --Counseled about alcohol abstinence  -Colon cancer in remission 3 years ago status post colostomy revision follow-up gastroenterology for surveillance as recommended next months Dr. Warren   -Hypomagnesemia controlled needs to follow up electrolytes today follow-up for further recommendation  -Hypertension controlled to continue with lisinopril   -paroxysmal atrial fibrillation patient on eliqis , is well controlled, we will continue with current medication.  No palpitation no leg swelling  -Obstructive sleep apnea compensated.  -Borderline-hypercholesterolemia continue low-fat diet  - Complete blood work  Physical exam 3 months

## 2024-01-03 DIAGNOSIS — M10.9 GOUT, UNSPECIFIED CAUSE, UNSPECIFIED CHRONICITY, UNSPECIFIED SITE: ICD-10-CM

## 2024-01-03 RX ORDER — ALLOPURINOL 100 MG/1
TABLET ORAL
Qty: 270 TABLET | Refills: 0 | Status: SHIPPED | OUTPATIENT
Start: 2024-01-03 | End: 2024-02-06

## 2024-01-08 DIAGNOSIS — G47.33 OBSTRUCTIVE SLEEP APNEA: ICD-10-CM

## 2024-01-12 ENCOUNTER — LAB (OUTPATIENT)
Dept: LAB | Facility: LAB | Age: 84
End: 2024-01-12
Payer: MEDICARE

## 2024-01-12 ENCOUNTER — HOSPITAL ENCOUNTER (OUTPATIENT)
Dept: RADIOLOGY | Facility: HOSPITAL | Age: 84
Discharge: HOME | End: 2024-01-12
Payer: MEDICARE

## 2024-01-12 DIAGNOSIS — Z79.899 HIGH RISK MEDICATION USE: ICD-10-CM

## 2024-01-12 DIAGNOSIS — E53.8 VITAMIN B12 DEFICIENCY: ICD-10-CM

## 2024-01-12 DIAGNOSIS — I10 HYPERTENSION, UNSPECIFIED TYPE: ICD-10-CM

## 2024-01-12 DIAGNOSIS — E55.9 VITAMIN D DEFICIENCY, UNSPECIFIED: ICD-10-CM

## 2024-01-12 DIAGNOSIS — R91.1 NODULE OF LOWER LOBE OF RIGHT LUNG: ICD-10-CM

## 2024-01-12 DIAGNOSIS — E78.00 HYPERCHOLESTEREMIA: ICD-10-CM

## 2024-01-12 DIAGNOSIS — R53.83 OTHER FATIGUE: ICD-10-CM

## 2024-01-12 LAB
25(OH)D3 SERPL-MCNC: 14 NG/ML (ref 30–100)
ALBUMIN SERPL BCP-MCNC: 3.9 G/DL (ref 3.4–5)
ALP SERPL-CCNC: 221 U/L (ref 33–136)
ALT SERPL W P-5'-P-CCNC: 39 U/L (ref 10–52)
ANION GAP SERPL CALC-SCNC: 13 MMOL/L (ref 10–20)
AST SERPL W P-5'-P-CCNC: 36 U/L (ref 9–39)
BASOPHILS # BLD AUTO: 0.04 X10*3/UL (ref 0–0.1)
BASOPHILS NFR BLD AUTO: 0.8 %
BILIRUB SERPL-MCNC: 0.9 MG/DL (ref 0–1.2)
BUN SERPL-MCNC: 14 MG/DL (ref 6–23)
CALCIUM SERPL-MCNC: 9.6 MG/DL (ref 8.6–10.3)
CHLORIDE SERPL-SCNC: 105 MMOL/L (ref 98–107)
CHOLEST SERPL-MCNC: 164 MG/DL (ref 0–199)
CHOLESTEROL/HDL RATIO: 4.4
CO2 SERPL-SCNC: 27 MMOL/L (ref 21–32)
CREAT SERPL-MCNC: 0.89 MG/DL (ref 0.5–1.3)
EGFRCR SERPLBLD CKD-EPI 2021: 85 ML/MIN/1.73M*2
EOSINOPHIL # BLD AUTO: 0.08 X10*3/UL (ref 0–0.4)
EOSINOPHIL NFR BLD AUTO: 1.6 %
ERYTHROCYTE [DISTWIDTH] IN BLOOD BY AUTOMATED COUNT: 13.6 % (ref 11.5–14.5)
GLUCOSE SERPL-MCNC: 88 MG/DL (ref 74–99)
HCT VFR BLD AUTO: 41.6 % (ref 41–52)
HDLC SERPL-MCNC: 36.9 MG/DL
HGB BLD-MCNC: 13.6 G/DL (ref 13.5–17.5)
IMM GRANULOCYTES # BLD AUTO: 0.02 X10*3/UL (ref 0–0.5)
IMM GRANULOCYTES NFR BLD AUTO: 0.4 % (ref 0–0.9)
LDLC SERPL CALC-MCNC: 104 MG/DL
LYMPHOCYTES # BLD AUTO: 1.38 X10*3/UL (ref 0.8–3)
LYMPHOCYTES NFR BLD AUTO: 27.2 %
MCH RBC QN AUTO: 33.3 PG (ref 26–34)
MCHC RBC AUTO-ENTMCNC: 32.7 G/DL (ref 32–36)
MCV RBC AUTO: 102 FL (ref 80–100)
MONOCYTES # BLD AUTO: 0.41 X10*3/UL (ref 0.05–0.8)
MONOCYTES NFR BLD AUTO: 8.1 %
NEUTROPHILS # BLD AUTO: 3.15 X10*3/UL (ref 1.6–5.5)
NEUTROPHILS NFR BLD AUTO: 61.9 %
NON HDL CHOLESTEROL: 127 MG/DL (ref 0–149)
NRBC BLD-RTO: 0 /100 WBCS (ref 0–0)
PLATELET # BLD AUTO: 188 X10*3/UL (ref 150–450)
POTASSIUM SERPL-SCNC: 4.7 MMOL/L (ref 3.5–5.3)
PROT SERPL-MCNC: 7.9 G/DL (ref 6.4–8.2)
RBC # BLD AUTO: 4.09 X10*6/UL (ref 4.5–5.9)
SODIUM SERPL-SCNC: 140 MMOL/L (ref 136–145)
TRIGL SERPL-MCNC: 115 MG/DL (ref 0–149)
TSH SERPL-ACNC: 2.83 MIU/L (ref 0.44–3.98)
VIT B12 SERPL-MCNC: 338 PG/ML (ref 211–911)
VLDL: 23 MG/DL (ref 0–40)
WBC # BLD AUTO: 5.1 X10*3/UL (ref 4.4–11.3)

## 2024-01-12 PROCEDURE — 36415 COLL VENOUS BLD VENIPUNCTURE: CPT

## 2024-01-12 PROCEDURE — 82607 VITAMIN B-12: CPT

## 2024-01-12 PROCEDURE — 80053 COMPREHEN METABOLIC PANEL: CPT

## 2024-01-12 PROCEDURE — 82306 VITAMIN D 25 HYDROXY: CPT

## 2024-01-12 PROCEDURE — 71250 CT THORAX DX C-: CPT | Performed by: RADIOLOGY

## 2024-01-12 PROCEDURE — 85025 COMPLETE CBC W/AUTO DIFF WBC: CPT

## 2024-01-12 PROCEDURE — 80061 LIPID PANEL: CPT

## 2024-01-12 PROCEDURE — 71250 CT THORAX DX C-: CPT

## 2024-01-12 PROCEDURE — 84443 ASSAY THYROID STIM HORMONE: CPT

## 2024-01-25 ENCOUNTER — OFFICE VISIT (OUTPATIENT)
Dept: SLEEP MEDICINE | Facility: CLINIC | Age: 84
End: 2024-01-25
Payer: MEDICARE

## 2024-01-25 ENCOUNTER — OFFICE VISIT (OUTPATIENT)
Dept: PRIMARY CARE | Facility: CLINIC | Age: 84
End: 2024-01-25
Payer: MEDICARE

## 2024-01-25 VITALS
BODY MASS INDEX: 27.4 KG/M2 | TEMPERATURE: 97.4 F | SYSTOLIC BLOOD PRESSURE: 124 MMHG | WEIGHT: 167.2 LBS | DIASTOLIC BLOOD PRESSURE: 68 MMHG | HEART RATE: 61 BPM | OXYGEN SATURATION: 99 %

## 2024-01-25 VITALS
BODY MASS INDEX: 27.07 KG/M2 | HEART RATE: 84 BPM | DIASTOLIC BLOOD PRESSURE: 72 MMHG | SYSTOLIC BLOOD PRESSURE: 125 MMHG | OXYGEN SATURATION: 96 % | WEIGHT: 165.2 LBS

## 2024-01-25 DIAGNOSIS — C18.9 ADENOCARCINOMA, COLON (MULTI): ICD-10-CM

## 2024-01-25 DIAGNOSIS — G47.33 OSA (OBSTRUCTIVE SLEEP APNEA): Primary | ICD-10-CM

## 2024-01-25 DIAGNOSIS — Z72.821 INADEQUATE SLEEP HYGIENE: ICD-10-CM

## 2024-01-25 DIAGNOSIS — E55.9 VITAMIN D DEFICIENCY: Primary | ICD-10-CM

## 2024-01-25 DIAGNOSIS — G47.9 SLEEP DISTURBANCE: ICD-10-CM

## 2024-01-25 DIAGNOSIS — K51.219 ULCERATIVE PROCTITIS WITH COMPLICATION (MULTI): ICD-10-CM

## 2024-01-25 DIAGNOSIS — I50.22 CHRONIC SYSTOLIC CONGESTIVE HEART FAILURE (MULTI): ICD-10-CM

## 2024-01-25 DIAGNOSIS — E66.3 OVERWEIGHT (BMI 25.0-29.9): ICD-10-CM

## 2024-01-25 DIAGNOSIS — I48.11 LONGSTANDING PERSISTENT ATRIAL FIBRILLATION (MULTI): ICD-10-CM

## 2024-01-25 DIAGNOSIS — F10.20 UNCOMPLICATED ALCOHOL DEPENDENCE (MULTI): ICD-10-CM

## 2024-01-25 DIAGNOSIS — I10 PRIMARY HYPERTENSION: ICD-10-CM

## 2024-01-25 DIAGNOSIS — Z99.89 CONTINUOUS POSITIVE AIRWAY PRESSURE DEPENDENCE: ICD-10-CM

## 2024-01-25 DIAGNOSIS — K21.9 GASTROESOPHAGEAL REFLUX DISEASE WITHOUT ESOPHAGITIS: ICD-10-CM

## 2024-01-25 DIAGNOSIS — Z13.31 POSITIVE SCREENING FOR DEPRESSION ON 2-ITEM PATIENT HEALTH QUESTIONNAIRE (PHQ-2): ICD-10-CM

## 2024-01-25 DIAGNOSIS — R53.83 FATIGUE, UNSPECIFIED TYPE: ICD-10-CM

## 2024-01-25 PROCEDURE — 1036F TOBACCO NON-USER: CPT | Performed by: INTERNAL MEDICINE

## 2024-01-25 PROCEDURE — 3074F SYST BP LT 130 MM HG: CPT | Performed by: INTERNAL MEDICINE

## 2024-01-25 PROCEDURE — 3078F DIAST BP <80 MM HG: CPT | Performed by: INTERNAL MEDICINE

## 2024-01-25 PROCEDURE — 1160F RVW MEDS BY RX/DR IN RCRD: CPT

## 2024-01-25 PROCEDURE — 1159F MED LIST DOCD IN RCRD: CPT

## 2024-01-25 PROCEDURE — 3078F DIAST BP <80 MM HG: CPT

## 2024-01-25 PROCEDURE — 1036F TOBACCO NON-USER: CPT

## 2024-01-25 PROCEDURE — 1159F MED LIST DOCD IN RCRD: CPT | Performed by: INTERNAL MEDICINE

## 2024-01-25 PROCEDURE — 3074F SYST BP LT 130 MM HG: CPT

## 2024-01-25 PROCEDURE — 99214 OFFICE O/P EST MOD 30 MIN: CPT | Performed by: INTERNAL MEDICINE

## 2024-01-25 PROCEDURE — 99204 OFFICE O/P NEW MOD 45 MIN: CPT

## 2024-01-25 PROCEDURE — 1160F RVW MEDS BY RX/DR IN RCRD: CPT | Performed by: INTERNAL MEDICINE

## 2024-01-25 RX ORDER — SAME BUTANEDISULFONATE/BETAINE 400-600 MG
5000 POWDER IN PACKET (EA) ORAL DAILY
Qty: 90 CAPSULE | Refills: 3 | Status: SHIPPED | OUTPATIENT
Start: 2024-01-25 | End: 2024-04-25 | Stop reason: SDUPTHER

## 2024-01-25 RX ORDER — OMEPRAZOLE 20 MG/1
20 CAPSULE, DELAYED RELEASE ORAL DAILY
Qty: 90 CAPSULE | Refills: 1 | Status: SHIPPED | OUTPATIENT
Start: 2024-01-25

## 2024-01-25 ASSESSMENT — PATIENT HEALTH QUESTIONNAIRE - PHQ9
SUM OF ALL RESPONSES TO PHQ9 QUESTIONS 1 AND 2: 4
2. FEELING DOWN, DEPRESSED OR HOPELESS: MORE THAN HALF THE DAYS
1. LITTLE INTEREST OR PLEASURE IN DOING THINGS: MORE THAN HALF THE DAYS

## 2024-01-25 ASSESSMENT — SLEEP AND FATIGUE QUESTIONNAIRES
SLEEP_PROBLEM_NOTICEABLE_TO_OTHERS: A LITTLE
HOW LIKELY ARE YOU TO NOD OFF OR FALL ASLEEP WHILE SITTING QUIETLY AFTER LUNCH WITHOUT ALCOHOL: WOULD NEVER DOZE
DIFFICULTY_FALLING_ASLEEP: MILD
WAKING_TOO_EARLY: MODERATE
SLEEP_PROBLEM_INTERFERES_DAILY_ACTIVITIES: NOT AT ALL NOTICEABLE
SATISFACTION_WITH_CURRENT_SLEEP_PATTERN: SATISFIED
HOW LIKELY ARE YOU TO NOD OFF OR FALL ASLEEP WHILE SITTING AND TALKING TO SOMEONE: WOULD NEVER DOZE
HOW LIKELY ARE YOU TO NOD OFF OR FALL ASLEEP IN A CAR, WHILE STOPPED FOR A FEW MINUTES IN TRAFFIC: WOULD NEVER DOZE
HOW LIKELY ARE YOU TO NOD OFF OR FALL ASLEEP WHILE WATCHING TV: MODERATE CHANCE OF DOZING
HOW LIKELY ARE YOU TO NOD OFF OR FALL ASLEEP WHILE LYING DOWN TO REST IN THE AFTERNOON WHEN CIRCUMSTANCES PERMIT: WOULD NEVER DOZE
ESS-CHAD TOTAL SCORE: 5
WORRIED_DISTRESSED_DUE_TO_SLEEP: NOT AT ALL NOTICEABLE
SITING INACTIVE IN A PUBLIC PLACE LIKE A CLASS ROOM OR A MOVIE THEATER: WOULD NEVER DOZE
DIFFICULTY_STAYING_ASLEEP: MILD
HOW LIKELY ARE YOU TO NOD OFF OR FALL ASLEEP WHILE SITTING AND READING: MODERATE CHANCE OF DOZING
HOW LIKELY ARE YOU TO NOD OFF OR FALL ASLEEP WHEN YOU ARE A PASSENGER IN A CAR FOR AN HOUR WITHOUT A BREAK: SLIGHT CHANCE OF DOZING

## 2024-01-25 ASSESSMENT — ENCOUNTER SYMPTOMS
SORE THROAT: 0
ABDOMINAL PAIN: 0
FEVER: 0
COUGH: 0
HYPERTENSION: 1
BLOOD IN STOOL: 0
PALPITATIONS: 0
UNEXPECTED WEIGHT CHANGE: 0
ARTHRALGIAS: 0
FATIGUE: 0
DIZZINESS: 0
BRUISES/BLEEDS EASILY: 0
DIARRHEA: 0
SINUS PAIN: 0
WHEEZING: 0
HEADACHES: 0
DIFFICULTY URINATING: 0

## 2024-01-25 ASSESSMENT — ANXIETY QUESTIONNAIRES
7. FEELING AFRAID AS IF SOMETHING AWFUL MIGHT HAPPEN: NOT AT ALL
2. NOT BEING ABLE TO STOP OR CONTROL WORRYING: NOT AT ALL
5. BEING SO RESTLESS THAT IT IS HARD TO SIT STILL: NOT AT ALL
6. BECOMING EASILY ANNOYED OR IRRITABLE: NOT AT ALL
3. WORRYING TOO MUCH ABOUT DIFFERENT THINGS: NOT AT ALL
1. FEELING NERVOUS, ANXIOUS, OR ON EDGE: NOT AT ALL
4. TROUBLE RELAXING: NOT AT ALL
GAD7 TOTAL SCORE: 0

## 2024-01-25 NOTE — LETTER
Your Provider has ordered you a sleep study.  The process for a home sleep study is as follows:    HOME SLEEP STUDY    Your test was ordered today. It will usually take 2 - 3 business days for Insurance to approve the order.     Once you test is approved it will be sent to the ordering sleep lab. When the sleep lab is notified of the new order, they will reach out to you to get you scheduled for a pick-up date for your Home Sleep Study Kit or notify you of when it will be mailed (CLEVMED).     They usually will reach out to you about 1 week after your test is ordered. Please contact the office at [phone] if you have not heard back from them in 2 weeks after you have seen your provider. See below for list of sleep lab contact numbers, if needed.     Sleep Lab Contact Information:   Main Phone Line (scheduling only): [phone]), option 3  Adult and Pediatric Locations  [facility] (6 years and older): [facility] - 4th floor ([facility]) After hours line: [phone]  [facility] [place] [place] (Main campus: All ages): [facility], 6th floor. After hours line: [phone]   [place] (5 years and older; younger considered on case-by-case basis): [address]; [address], [address]. Scheduling  After hours line: [phone]   [place] (6 years and older): [address]; [address]; [address]   [place] (6 years and older): [address] [facility], [address]  After hours line: [phone]   [place] (13 years and older) in [place]: [address], 2nd floor  After hours line: [phone]   [place] (13 year and older): [address]  After hours line: [phone]     Adult Only Locations:   [place] (18 years and older): [address] [facility], 2nd floor   [place] (18 years and older): [address] [facility]; 4th floor  After hours line: [phone]  [facility] (18 years and older) at  [place]: [address]  After hours line: [phone]

## 2024-01-25 NOTE — LETTER
Frequency of replacement of CPAP / BIPAP supplies as per Medicare guidelines  (This frequency of replacement can be different with private insurances or Medicaid)    Nasal mask, full face mask, tubing, heated tubing - 1 per 3 months  Headgear, water chamber, chin strap, reusable filter - 1 per 6 months  Disposable filter, replacement cushion, nasal pillows - 2 per month  Replacement cushion / liner for interface - 1 per month    CPAP / BIPAP Cleaning Recommendations    There are several specialized CPAP cleaning machines on the market. None of them are as good as soap and water. The only thing that you need to clean daily is the part of the mask that contacts your skin also known as the mask insert. This mask insert needs to be cleaned with soap and water daily. Another option is to use baby wipes daily.     The rest of the mask, the water chamber, and the tubing should be cleaned at least once a week.    The water tank needs to be filled with distilled water to prevent buildup of white deposits in the future. If you cannot find distilled water, you can use tap water but expect to have white deposits buildup seen after prolonged use with tap water. If you start seeing white deposits on the water tank, you can clean it by filling it with equal parts of distilled white vinegar and water. Let the vinegar-water mixture sit for 2 hours, and then rinse it with running tap water. Clean with soap and water then let it dry.

## 2024-01-25 NOTE — PROGRESS NOTES
" Patient: Guero Boykin  : 1940 AGE: 83 y.o. SEX:male   MRN: [id number]   Provider: [facility]     Location [facility]   Service Date: 2/3/2024     PCP: Margaret Barr MD   Referred by: Margaret Barr MD            [facility]  NEW PATIENT VISIT NOTE      PATIENT INFORMATION     The patient's referring provider is: Margaret Barr MD  The patient's Primary Care Provider: Margaret Barr MD    HISTORY OF PRESENT ILLNESS     Patient ID: Guero Boykin is a 83 y.o. male who presents to a [facility] for evaluation for GIA needs new machine    Patient is here alone today.  The patient has pertinent hx of GIA, inadequate sleep hygiene, EDS, fatigue, overweight, HTN, Afib, CHF, former smoker, chronic sinusitis, GERD, IBS, ulcerative colitis,colon cancer, anemia, alcohol dependence, back pain, arthritis, and chronic pain.     24  Patient here today for evaluation for new CPAP machine. Patient has been on CPAP for many years. Recently his machine started given the error message \"motor life exceed...\". Patient has not had sleep study in over 10 years. I discussed the process for a new CPAP machine, which including new sleep study. We discussed different testing option HSAT vs In-lab. Given long-standing hx of IGA, well controlled on PAP, it is reasonable to complete a HSAT one night off of CPAP for requirement for new CPAP machine. He is agreeable to this plan.   He reports no issues with his current machine or mask. I reviewed the data from his machine. He is using nightly, used 30/30 nights for 5.13 hrs on average with residual AHI of 2/hr. Current CPAP setting are 5 - 15 cmH20 EPR 3.   Patient does report daytime sleepiness and fatigue, this could be d/t his cardiac issues as he is very limited in his activities. I encourage the patient when napping during the day to utilize his CPAP as " well.  Patient does drink alcohol daily, he does sometimes drink before bed. He has long-standing hx of alcohol dependence. Encouraged patient to not drink past dinner time.   BP was WNL today. Denied any recent episode of Afib or exacerbation of CHF. Encouraged treatment of GIA with PAP to reduce risk of exacerbation of these chronic conditions.  Weight remains stable, not actively trying, does monitor weight d/t HF dx.   +PHQ-2, not on medications, encouraged follow-up with PCP.         SLEEP HISTORY     SLEEP STUDY HISTORY  HSAT ordered today    SLEEP-WAKE SCHEDULE  Bedtime:  10 pm daily  Subjective sleep latency: 15 mins or less  Difficulty falling asleep: No   Number of awakenings: x3 - 4 times per night spontaneously   Falls back asleep in 10 mins or less  Difficulty staying asleep: Yes  due to nocturia, breathing problems, and unknown reasons  Final wake time:  8 am daily  Out of bed time:  8 am daily  Shift work: retired  Naps: daily for 30 mins  Feels rested after a nap: Yes   Average sleep duration (excluding naps): 6 hrs    SLEEP ENVIRONMENT  Sleep location: bed  Sleep status: sleeps alone  Preferred sleep position: side  TV in bedroom: no  Room is dark:  Yes  Room is quiet: Yes  Room is cool: Yes  Bed comfort: good    SLEEP HABITS   Activities before bedtime: TV  Activities in bed: none  Clockwatching: No   Smoking: former  ETOH: daily  Marijuana: denied  Caffeine: daily  Sleep aids: Melatonin    WEIGHT: stable    Claustrophobia: No     STOP-BAN  ESS: 5   GEOFFREY: 7  PHQ-2:  POSITIVE  little interest or pleasure = 2  down, depressed or hopeless = 2  KENDRA-7: 0      REVIEW OF SYSTEMS     REVIEW OF SYSTEMS  Sleep-related ROS:    RLS screen:  RLSSCREEN: - Sensations: Patient does not have unusual sensations in their extremities that cause an urge to move them   - Movement: Patient has not been told that their legs kick or jerk during sleep    Naps:   Yes. Naps ARE/ARENOT: are refreshing.  Fatigue: symptoms  bothersome, but easily able to carry out all usual work/school/family activities    Review of Systems  SLEEP ROS: snoring, tossing and turning, decreased memory, decreased concentration, excessive daytime sleepiness, feels sleepy during the day, take naps during the day    All other systems have been reviewed and are negative.      ALLERGIES AND MEDICATIONS     ALLERGIES  No Known Allergies    MEDICATIONS  Current Outpatient Medications   Medication Sig Dispense Refill    allopurinol (Zyloprim) 100 mg tablet Take three tablets daily 270 tablet 0    apixaban (Eliquis) 2.5 mg tablet Take 2 tablets (5 mg) by mouth 2 times a day.      cholecalciferol, vitD3,/vit K2 (vitamin D3-vitamin K2) 125-90 mcg capsule Take 5,000 Units by mouth once daily. 90 capsule 3    dorzolamide (Trusopt) 2 % ophthalmic solution Administer 1 drop into the left eye 2 times a day.      lisinopril 20 mg tablet Take 1 tablet (20 mg) by mouth once daily. 90 tablet 3    mesalamine ER (Apriso) 0.375 gram 24 hr capsule Take 4 capsules (1.5 g) by mouth once daily in the morning. Last refill pt must make appt      metoprolol succinate XL (Toprol-XL) 100 mg 24 hr tablet Take 1 tablet (100 mg) by mouth once daily. 90 tablet 3    omeprazole (PriLOSEC) 20 mg DR capsule Take 1 capsule (20 mg) by mouth once daily. 90 capsule 1    probenecid-colchicine 500-0.5 mg tablet Take 1 tablet by mouth once daily. 90 tablet 1    spironolactone (Aldactone) 25 mg tablet Take 0.5 tablets (12.5 mg) by mouth once daily.       No current facility-administered medications for this visit.         PAST HISTORY     PERTINENT PAST MEDICAL HISTORY: See HPI    PERTINENT PAST SURGICAL HISTORY for Sleep Medicine:  tonsillectomy    PERTINENT FAMILY HISTORY for Sleep Medicine:  Patient denies family history of any sleep disorder.  Patient denies family history of sleep apnea.    PERTINENT SOCIAL HISTORY:  He  reports that he quit smoking about 54 years ago. His smoking use included  "cigarettes. He has a 15.00 pack-year smoking history. He has never used smokeless tobacco. He reports that he does not currently use alcohol after a past usage of about 14.0 standard drinks of alcohol per week. He reports that he does not use drugs. He currently lives alone and retired    Active Problems, Allergy List, Medication List, and PMH/PSH/FH/Social Hx have been reviewed and reconciled in chart. No significant changes unless documented in the pertinent chart section. Updates made when necessary.       PHYSICAL EXAM     VITAL SIGNS: /72   Pulse 84   Wt 74.9 kg (165 lb 3.2 oz)   SpO2 96%   BMI 27.07 kg/m²       CURRENT WEIGHT:   Vitals:    01/25/24 1526   Weight: 74.9 kg (165 lb 3.2 oz)      BMI: Body mass index is 27.07 kg/m².    PREVIOUS WEIGHTS:  Wt Readings from Last 3 Encounters:   01/25/24 74.9 kg (165 lb 3.2 oz)   01/25/24 75.8 kg (167 lb 3.2 oz)   11/01/23 74.4 kg (164 lb)       Physical Exam  Constitutional: Awake, not in distress  Lungs: Clear to auscultation bilateral, no cough noted  Heart: Regular rate and rhythm  Skin: Warm, no rash  Neuro: No tremors, moves all extremities  Psych: alert and oriented to time, place, and person    ENT: Modified Mallampati score - III            RESULTS/DATA     No results found for: \"IRON\", \"TRANSFERRIN\", \"IRONSAT\", \"TIBC\", \"FERRITIN\"    Bicarbonate   Date Value Ref Range Status   01/12/2024 27 21 - 32 mmol/L Final       PAP Adherence  A PAP adherence download was obtained and data was reviewed personally today in clinic.      ASSESSMENT/PLAN     Assessment/Plan   Guero Boykin is a 83 y.o. male presents today in [facility] with the following problems:    CURRENT DME: [facility]    OBSTRUCTIVE SLEEP APNEA,   s/p tonsillectomy  currently on auto-CPAP 5 - 15 cmH20 EPR 3  Excellent compliance to PAP therapy, residual AHI at goal, and good control of GIA symptoms  - Patient's risk factors for GIA: age, gender, HTN, Afib, CHF, use " of ETOH, and narrow crowded upper airway anatomy  - Current symptoms are: see HPI  - GIA diagnosed by PSG in past, over 10 years ago, no data available for review.   - Retrieved and personally reviewed recent PAP adherence download data today. See HPI.   - - used 30/30 days for an average of 5 hrs 13 mins with residual AHI of 2/hr  - Continue current PAP settings.   - patient current machine shows message that machine needs replaced   - discussed getting new machine, will need repeat testing, HSAT ordered today  - once receive HSAT results, will order new machine and follow-up after getting new machine. - patient verbalized understanding  - patient does need a new mask, has not replaced his mask in several years      INADEQUATE SLEEP HYGIENE / EXCESSIVE DAYTIME SLEEPINESS (EDS) / FATIGUE  + SLEEP DISTURBANCES  - due to combination of inadequate sleep hygiene, depression, sleep apnea, chronic pain, nocturia, alcohol use, and marijuana use. Meds may be a contributing factor as well.  - discussed with patient good sleep hygiene   - Important to get good daily dose of natural sunlight to help wakefulness & energy  - Reduce artificial lighting at night, especially light from screens (i.e. cellphones, TV, tablets)  - Exercise is helpful for your mental and physical health  - Have a consistent bedtime routine 1 hours prior to your usual bedtime  - If going to take a nap, it should be less than 30 minutes and prior to 3 pm  - Limit alcohol and caffeine use   - Avoidance of marijuana and/or CBD use      OVERWEIGHT  - BMI today Body mass index is 27.07 kg/m².   - no significant weight changes noted or reported  - Encouraged patient to lose weight with diet and exercise.   - Weight loss can help in the long term treatment of GIA.  - Defer management to PCP    HYPERTENSION  - BP today 125/72  - well controlled with medication, denies any issues with management   - encouraged daily exercise with healthy diet for BP and GIA  management  - Defer management to PCP    ATRIAL FIBRILLATION  - denies any recent episodes or symptoms of Afib  - discuss relationship of GIA and Afib in regards to treatment, encouraged nightly use of CPAP as well as this is a long-term treatment, verbalized understanding  - on meds per Cardio  - Defer management to Cardio    CHF  - denies any sudden recent weight gain or increase in swelling  - most recent echo: 4/2022   CONCLUSIONS:     1. The left ventricular systolic function is mildly decreased with a 50% estimated ejection fraction.    2. Spectral Doppler shows an abnormal pattern of left ventricular diastolic filling.     3. There is moderate concentric left ventricular hypertrophy.     4. The left atrium is mild to moderately dilated.     5. Mild aortic valve stenosis.     6. The estimated pulmonary artery pressure is mildly elevated with the RVSP at 41.6 mmHg.     7. The patient is in atrial fibrillation which may influence the estimate of left ventricular function and transvalvular flows.  - on meds per Cardio  - discuss relationship of GIA and CHF in regards to treatment, encouraged nightly use of PAP therapy, as well as this is a long-term treatment, verbalized understanding   - Defer management to Cardio    DEPRESSION  / ALCOHOL DEPENDENCE  + PHQ-2 screen, NEGATIVE SCREEN KENDRA  - denies any SI, HI or hallucinations   - not currently on medications  - encouraged patient to limit alcohol intake to prior to dinner time  - avoidance of marijuana use close to bedtime  - defer management to PCP      SMOKING STATUS screen  - former smoker     All of patient's questions were answered. He verbalizes understanding and agreement with my assessment and plan.

## 2024-01-25 NOTE — PROGRESS NOTES
Subjective   Patient ID: Guero Boykin is a 83 y.o. male who presents for Hypertension and Results (BW, MRI).    -    -Recent blood work and CAT scan reviewed with patient  -Severe vitamin D deficiency need to start on vitamin D 5000 daily  -Lung nodule right lung stable continue monitoring conservatively repeat CT scan in 1 year January 2025    - Chronic constipation controlled to continue with MiraLAX as recommended improving  ---Counseled about alcohol abstinence  -Colon cancer in remission 3 years ago status post colostomy revision follow-up gastroenterology for surveillance as recommended next months Dr. Warren   -Hypomagnesemia controlled needs to follow up electrolytes today follow-up for further recommendation  -Hypertension controlled to continue with lisinopril   -paroxysmal atrial fibrillation patient on eliqis , is well controlled, we will continue with current medication.  No palpitation no leg swelling  -Obstructive sleep apnea compensated.  -Borderline-hypercholesterolemia continue low-fat diet follow-up as needed  Follow-up 3 months    Hypertension  Pertinent negatives include no chest pain, headaches or palpitations.          Review of Systems   Constitutional:  Negative for fatigue, fever and unexpected weight change.   HENT:  Negative for congestion, ear discharge, ear pain, mouth sores, sinus pain and sore throat.    Eyes:  Negative for visual disturbance.   Respiratory:  Negative for cough and wheezing.    Cardiovascular:  Negative for chest pain, palpitations and leg swelling.   Gastrointestinal:  Negative for abdominal pain, blood in stool and diarrhea.   Genitourinary:  Negative for difficulty urinating.   Musculoskeletal:  Negative for arthralgias.   Skin:  Negative for rash.   Neurological:  Negative for dizziness and headaches.   Hematological:  Does not bruise/bleed easily.   Psychiatric/Behavioral:  Negative for behavioral problems.    All other systems reviewed and are  "negative.      Objective   No results found for: \"HGBA1C\"   /68   Pulse 61   Temp 36.3 °C (97.4 °F)   Wt 75.8 kg (167 lb 3.2 oz)   SpO2 99%   BMI 27.40 kg/m²     Physical Exam  Vitals and nursing note reviewed.   Constitutional:       Appearance: Normal appearance.   HENT:      Head: Normocephalic.      Nose: Nose normal.   Eyes:      Conjunctiva/sclera: Conjunctivae normal.      Pupils: Pupils are equal, round, and reactive to light.   Cardiovascular:      Rate and Rhythm: Regular rhythm.   Pulmonary:      Effort: Pulmonary effort is normal.      Breath sounds: Normal breath sounds.   Abdominal:      General: Abdomen is flat.      Palpations: Abdomen is soft.   Musculoskeletal:      Cervical back: Neck supple.   Skin:     General: Skin is warm.   Neurological:      General: No focal deficit present.      Mental Status: He is oriented to person, place, and time.   Psychiatric:         Mood and Affect: Mood normal.         Assessment/Plan   Guero was seen today for hypertension and results.  Diagnoses and all orders for this visit:  Vitamin D deficiency (Primary)  -     omeprazole (PriLOSEC) 20 mg DR capsule; Take 1 capsule (20 mg) by mouth once daily.  -     cholecalciferol, vitD3,/vit K2 (vitamin D3-vitamin K2) 125-90 mcg capsule; Take 5,000 Units by mouth once daily.  Gastroesophageal reflux disease without esophagitis  -     omeprazole (PriLOSEC) 20 mg DR capsule; Take 1 capsule (20 mg) by mouth once daily.  Adenocarcinoma, colon (CMS/HCC)  Chronic systolic congestive heart failure (CMS/HCC)  Longstanding persistent atrial fibrillation (CMS/HCC)  Ulcerative proctitis with complication (CMS/HCC)  Uncomplicated alcohol dependence (CMS/HCC)  Other orders  -     Follow Up In Primary Care - Established; Future   -    -Recent blood work and CAT scan reviewed with patient  -Severe vitamin D deficiency need to start on vitamin D 5000 daily  -Lung nodule right lung stable continue monitoring conservatively " repeat CT scan in 1 year January 2025    - Chronic constipation controlled to continue with MiraLAX as recommended improving  ---Counseled about alcohol abstinence  -Colon cancer in remission 3 years ago status post colostomy revision follow-up gastroenterology for surveillance as recommended next months Dr. Warren   -Hypomagnesemia controlled needs to follow up electrolytes today follow-up for further recommendation  -Hypertension controlled to continue with lisinopril   -paroxysmal atrial fibrillation patient on eliqis , is well controlled, we will continue with current medication.  No palpitation no leg swelling  -Obstructive sleep apnea compensated.  -Borderline-hypercholesterolemia continue low-fat diet follow-up as needed  Follow-up 3 months

## 2024-01-25 NOTE — PATIENT INSTRUCTIONS
It was a pleasure meeting you today Guero ERON MartinezBoykin     CURRENT DME: [facility]    As we discussed today in clinic:    1. Continue with your current CPAP setting.  I will order a new machine once I have your sleep study back  2. II ordered you a home sleep study - your daughter can  the machine at the sleep lab as she is going to help you with it. REMEMBER DO NOT USE YOUR CPAP THE NIGHT OF THE SLEEP STUDY  3. Ordered you a new mask today - Resmed N30i mask from [facility]      As a general guideline, please replace your: PAP cushions every 2-4 weeks, mask every 3-6 months, hose every 3-6 months, Filter (disposable) every 2-4 weeks; machine may need replacement in 5-10 years (once they stop working).     Education handouts given: Sleep Study Information, Sleep Apnea Information , Sleep Hygiene , PAP Handouts, and Cleaning Schedule    Please follow-up in 3 months    ALWAYS BRING YOUR CPAP / BIPAP WITH YOU TO EVERY APPOINTMENT!  THANKS    FOR QUESTIONS AND CONCERNS:   1. In case of problems with machine or mask interface, please contact your DME company first. [employer] is the company that provides you the machine and/or CPAP supplies. If [employer] if your DME, you can reach them at [phone] or [employer] ([employer]) [phone].  2. For SLEEP STUDY appointments, please call [phone] ([place]) or [phone] / [phone] ([place]) or any other  Sleep Lab location please call [phone]  3. For MEDICAL QUESTIONS, MEDICATION REFILLS, or CLINIC APPOINTMENT SCHEDULING, please call [phone] and my practice lead Shantel would gladly assist you with any concerns.   4. In the event that you are running more than 10 minutes late to your appointment or 5 minutes to virtual, I will kindly ask you to reschedule.    Here at [facility], we wish you a restful sleep!

## 2024-01-29 DIAGNOSIS — I48.91 ATRIAL FIBRILLATION, UNSPECIFIED TYPE (MULTI): Primary | ICD-10-CM

## 2024-01-30 RX ORDER — LISINOPRIL 20 MG/1
20 TABLET ORAL DAILY
Qty: 90 TABLET | Refills: 3 | Status: SHIPPED | OUTPATIENT
Start: 2024-01-30 | End: 2024-02-02 | Stop reason: SDUPTHER

## 2024-02-02 ENCOUNTER — TELEPHONE (OUTPATIENT)
Dept: CARDIOLOGY | Facility: CLINIC | Age: 84
End: 2024-02-02
Payer: MEDICARE

## 2024-02-02 DIAGNOSIS — I48.91 ATRIAL FIBRILLATION, UNSPECIFIED TYPE (MULTI): Primary | ICD-10-CM

## 2024-02-02 RX ORDER — LISINOPRIL 20 MG/1
20 TABLET ORAL DAILY
Qty: 90 TABLET | Refills: 3 | Status: SHIPPED | OUTPATIENT
Start: 2024-02-02 | End: 2024-04-23 | Stop reason: SDUPTHER

## 2024-02-03 PROBLEM — Z13.31 POSITIVE SCREENING FOR DEPRESSION ON 2-ITEM PATIENT HEALTH QUESTIONNAIRE (PHQ-2): Status: ACTIVE | Noted: 2024-02-03

## 2024-02-03 PROBLEM — E66.3 OVERWEIGHT (BMI 25.0-29.9): Status: ACTIVE | Noted: 2024-02-03

## 2024-02-03 PROBLEM — Z72.821 INADEQUATE SLEEP HYGIENE: Status: ACTIVE | Noted: 2024-02-03

## 2024-02-03 PROBLEM — G47.33 OSA (OBSTRUCTIVE SLEEP APNEA): Status: ACTIVE | Noted: 2024-02-03

## 2024-02-03 PROBLEM — R53.83 FATIGUE: Status: ACTIVE | Noted: 2024-02-03

## 2024-02-03 PROBLEM — G47.9 SLEEP DISTURBANCE: Status: ACTIVE | Noted: 2024-02-03

## 2024-02-03 PROBLEM — Z99.89 CONTINUOUS POSITIVE AIRWAY PRESSURE DEPENDENCE: Status: ACTIVE | Noted: 2024-02-03

## 2024-02-05 DIAGNOSIS — M10.9 GOUT, UNSPECIFIED CAUSE, UNSPECIFIED CHRONICITY, UNSPECIFIED SITE: ICD-10-CM

## 2024-02-06 RX ORDER — ALLOPURINOL 100 MG/1
TABLET ORAL
Qty: 270 TABLET | Refills: 0 | Status: SHIPPED | OUTPATIENT
Start: 2024-02-06 | End: 2024-02-09 | Stop reason: SDUPTHER

## 2024-02-07 ENCOUNTER — TELEPHONE (OUTPATIENT)
Dept: SLEEP MEDICINE | Facility: CLINIC | Age: 84
End: 2024-02-07
Payer: MEDICARE

## 2024-02-07 DIAGNOSIS — G47.33 OSA (OBSTRUCTIVE SLEEP APNEA): Primary | ICD-10-CM

## 2024-02-07 NOTE — TELEPHONE ENCOUNTER
Daughter in law called stating that Guero is having issues with the mask you gave him.  She would like you to call her to discuss.    [phone]

## 2024-02-09 DIAGNOSIS — M10.9 GOUT, UNSPECIFIED CAUSE, UNSPECIFIED CHRONICITY, UNSPECIFIED SITE: ICD-10-CM

## 2024-02-12 ENCOUNTER — TELEPHONE (OUTPATIENT)
Dept: PULMONOLOGY | Facility: CLINIC | Age: 84
End: 2024-02-12
Payer: MEDICARE

## 2024-02-12 RX ORDER — ALLOPURINOL 100 MG/1
TABLET ORAL
Qty: 270 TABLET | Refills: 0 | Status: SHIPPED | OUTPATIENT
Start: 2024-02-12 | End: 2024-04-25 | Stop reason: ALTCHOICE

## 2024-02-12 NOTE — TELEPHONE ENCOUNTER
Spoke with Annette today. Patient was not tolerating the sample mask given in clinic. Instructed for them to call [facility] for new mask. Submitted order today for mask fitting session.

## 2024-03-17 ENCOUNTER — TELEPHONE (OUTPATIENT)
Dept: SLEEP MEDICINE | Facility: HOSPITAL | Age: 84
End: 2024-03-17
Payer: MEDICARE

## 2024-03-18 NOTE — TELEPHONE ENCOUNTER
Dear Radha Rodríguez:    Thank you for referring your patient to a  Sleep Testing center for their home sleep apnea test (HSAT).     Your patient recently had an inconclusive HSAT due to poor quality recording.     American Academy of Sleep Medicine (AASM) Guidelines typically recommend an in-center, overnight sleep test after an inconclusive attempt at an HSAT. However, we can attempt an HSAT one more time if there are special circumstances.     Given that, would you like us to re-attempt the HSAT or would you like to order an in-lab sleep study?    If you would like an in-lab sleep study, please place the order.    If you would like a repeat HSAT, no new order is needed.    Please let us know how you would like us to proceed.    One of our caregivers will reach out to schedule your patient's in-lab sleep study once the order is placed.     Kind regards,  The  Sleep Medicine Team

## 2024-03-27 ENCOUNTER — TELEPHONE (OUTPATIENT)
Dept: SLEEP MEDICINE | Facility: CLINIC | Age: 84
End: 2024-03-27
Payer: MEDICARE

## 2024-03-27 DIAGNOSIS — I50.22 CHRONIC SYSTOLIC CONGESTIVE HEART FAILURE (MULTI): ICD-10-CM

## 2024-03-27 DIAGNOSIS — G47.33 OSA (OBSTRUCTIVE SLEEP APNEA): Primary | ICD-10-CM

## 2024-03-27 DIAGNOSIS — Z99.89 CONTINUOUS POSITIVE AIRWAY PRESSURE DEPENDENCE: ICD-10-CM

## 2024-03-27 NOTE — TELEPHONE ENCOUNTER
Patient sister called stating he never got a new machine. Seems very confused but stated he did do a home study that was mailed about 3wks ago.

## 2024-03-27 NOTE — PROGRESS NOTES
Patient called requesting for new machine. I was waiting for HSAT results. There was a computer error and the message for failed sleep study results was not sent properly. I ordered today an updated PSG today d/t previous failed HSAT. Patient to be notified.

## 2024-04-03 DIAGNOSIS — I48.11 LONGSTANDING PERSISTENT ATRIAL FIBRILLATION (MULTI): Primary | ICD-10-CM

## 2024-04-23 ENCOUNTER — OFFICE VISIT (OUTPATIENT)
Dept: CARDIOLOGY | Facility: CLINIC | Age: 84
End: 2024-04-23
Payer: MEDICARE

## 2024-04-23 VITALS
DIASTOLIC BLOOD PRESSURE: 75 MMHG | OXYGEN SATURATION: 100 % | HEART RATE: 79 BPM | SYSTOLIC BLOOD PRESSURE: 137 MMHG | BODY MASS INDEX: 26.52 KG/M2 | WEIGHT: 165 LBS | HEIGHT: 66 IN

## 2024-04-23 DIAGNOSIS — I48.91 ATRIAL FIBRILLATION, UNSPECIFIED TYPE (MULTI): ICD-10-CM

## 2024-04-23 DIAGNOSIS — I50.22 CHRONIC SYSTOLIC CONGESTIVE HEART FAILURE (MULTI): ICD-10-CM

## 2024-04-23 DIAGNOSIS — I48.11 LONGSTANDING PERSISTENT ATRIAL FIBRILLATION (MULTI): ICD-10-CM

## 2024-04-23 DIAGNOSIS — I10 PRIMARY HYPERTENSION: Primary | ICD-10-CM

## 2024-04-23 DIAGNOSIS — E66.3 OVERWEIGHT (BMI 25.0-29.9): ICD-10-CM

## 2024-04-23 DIAGNOSIS — I48.21 PERMANENT ATRIAL FIBRILLATION (MULTI): ICD-10-CM

## 2024-04-23 DIAGNOSIS — I48.3 TYPICAL ATRIAL FLUTTER (MULTI): ICD-10-CM

## 2024-04-23 PROCEDURE — 3078F DIAST BP <80 MM HG: CPT | Performed by: NURSE PRACTITIONER

## 2024-04-23 PROCEDURE — 1036F TOBACCO NON-USER: CPT | Performed by: NURSE PRACTITIONER

## 2024-04-23 PROCEDURE — 1159F MED LIST DOCD IN RCRD: CPT | Performed by: NURSE PRACTITIONER

## 2024-04-23 PROCEDURE — 1160F RVW MEDS BY RX/DR IN RCRD: CPT | Performed by: NURSE PRACTITIONER

## 2024-04-23 PROCEDURE — 99213 OFFICE O/P EST LOW 20 MIN: CPT | Performed by: NURSE PRACTITIONER

## 2024-04-23 PROCEDURE — 3075F SYST BP GE 130 - 139MM HG: CPT | Performed by: NURSE PRACTITIONER

## 2024-04-23 RX ORDER — SPIRONOLACTONE 25 MG/1
12.5 TABLET ORAL DAILY
Qty: 45 TABLET | Refills: 3 | Status: SHIPPED | OUTPATIENT
Start: 2024-04-23 | End: 2025-04-23

## 2024-04-23 RX ORDER — LISINOPRIL 20 MG/1
20 TABLET ORAL DAILY
Qty: 90 TABLET | Refills: 3 | Status: SHIPPED | OUTPATIENT
Start: 2024-04-23

## 2024-04-23 RX ORDER — CHOLECALCIFEROL (VITAMIN D3) 125 MCG
125 CAPSULE ORAL DAILY
COMMUNITY
Start: 2024-01-26

## 2024-04-23 RX ORDER — METOPROLOL SUCCINATE 100 MG/1
100 TABLET, EXTENDED RELEASE ORAL DAILY
Qty: 90 TABLET | Refills: 3 | Status: SHIPPED | OUTPATIENT
Start: 2024-04-23 | End: 2025-04-23

## 2024-04-23 NOTE — PROGRESS NOTES
Primary Care Physician: Margaret Barr MD  Primary Cardiologist:       Date of Visit: 04/23/2024  1:40 PM EDT  Location of visit:  [facility]   Type of Visit: Follow up             Chief Complaint   Patient presents with    Follow-up     Here for 1 year follow up  needs medication refills       HPI / Summary:   Guero Boykin is a 83 y.o. male  with  formerly known to Dr. Machado with HFrEF, LVEF 40%, no ischemia, permanent Afib on DOAC (Eliquis), sleep apnea, compliant with CPAP and S/P colon cancer resection in 2016.   who returns for routine follow up       Feels well without chest discomfort or unusual dyspnea.  He is compliant with CPAP.  Eliquis is affordable     12 system review is negative except as noted above         Medical History:   Past Medical History:   Diagnosis Date    Acute kidney failure, unspecified (CMS-HCC) 01/29/2016    Acute kidney injury    Calculus of gallbladder without cholecystitis without obstruction 03/27/2013    Cholelithiasis    Carpal tunnel syndrome, left upper limb 02/27/2020    Carpal tunnel syndrome of left wrist    Chest pain, unspecified 06/27/2013    Chest pain    Diverticulosis of intestine, part unspecified, without perforation or abscess without bleeding 06/27/2013    Diverticulosis    Gout, unspecified 12/07/2022    Gout    Hypomagnesemia     Hypomagnesemia    Hypomagnesemia 01/27/2017    Hypomagnesemia    Irritable bowel syndrome without diarrhea 03/27/2013    Irritable bowel syndrome    Liver disease, unspecified 03/27/2013    Chronic liver disease    Other conditions influencing health status 06/27/2013    Drug-induced Cholestasis    Other specified diseases of anus and rectum 12/06/2017    Hypertrophied anal papilla    Other specified diseases of gallbladder 12/07/2016    Gallbladder mass    Personal history of diseases of the skin and subcutaneous tissue 10/16/2014    History of actinic keratosis    Personal history of other diseases of the circulatory system  05/13/2013    History of hypertension    Personal history of other diseases of the digestive system 01/27/2017    History of hemorrhoids    Polyp of colon 06/27/2013    Benign colonic polyp    Syncope and collapse 03/27/2013    Fainting    Unspecified abdominal pain 06/27/2013    Abdominal pain    Unspecified hearing loss, left ear 11/15/2022    Decreased hearing of left ear       Social History:   Tobacco Use: Medium Risk (4/23/2024)    Patient History     Smoking Tobacco Use: Former     Smokeless Tobacco Use: Never     Passive Exposure: Not on file         MEDICATIONS:   Current Outpatient Medications   Medication Instructions    allopurinol (Zyloprim) 100 mg tablet TAKE 3 TABLETS DAILY    apixaban (ELIQUIS) 2.5 mg, oral, 2 times daily    cholecalciferol (VITAMIN D-3) 125 mcg, oral, Daily    cholecalciferol, vitD3,/vit K2 (vitamin D3-vitamin K2) 125-90 mcg capsule 5,000 Units, oral, Daily    dorzolamide (Trusopt) 2 % ophthalmic solution 1 drop, Left Eye, 2 times daily    lisinopril 20 mg, oral, Daily    mesalamine ER (Apriso) 0.375 gram 24 hr capsule 4 capsules, oral, Every morning, Last refill pt must make appt    metoprolol succinate XL (TOPROL-XL) 100 mg, oral, Daily    omeprazole (PRILOSEC) 20 mg, oral, Daily    probenecid-colchicine 500-0.5 mg tablet 1 tablet, oral, Daily    spironolactone (Aldactone) 25 mg tablet 0.5 tablets, oral, Daily         IMAGING REVIEWED:     Echocardiogram 4/056/2022  CONCLUSIONS:  1. The left ventricular systolic function is mildly decreased with a 50% estimated ejection fraction.  2. Spectral Doppler shows an abnormal pattern of left ventricular diastolic filling.  3. There is moderate concentric left ventricular hypertrophy.  4. The left atrium is mild to moderately dilated.  5. Mild aortic valve stenosis.  6. The estimated pulmonary artery pressure is mildly elevated with the RVSP at 41.6 mmHg.  7. The patient is in atrial fibrillation which may influence the estimate of left  "ventricular function and transvalvular flows.      Echocardiogram 1/28/2020  CONCLUSIONS:   1. The left ventricular systolic function is mildly decreased with a 45-50% estimated ejection fraction.   2. Slightly elevated RVSP.   3. There is mild aortic valve regurgitation.   4. There is global hypokinesis of the left ventricle with minor regional variations        LABS:  CBC with Differential:    Lab Results   Component Value Date    WBC 5.1 01/12/2024    RBC 4.09 (L) 01/12/2024    HGB 13.6 01/12/2024    HCT 41.6 01/12/2024     01/12/2024     (H) 01/12/2024    MCH 33.3 01/12/2024    MCHC 32.7 01/12/2024    RDW 13.6 01/12/2024    NRBC 0.0 01/12/2024    LYMPHOPCT 27.2 01/12/2024    MONOPCT 8.1 01/12/2024    EOSPCT 1.6 01/12/2024    BASOPCT 0.8 01/12/2024    MONOSABS 0.41 01/12/2024    LYMPHSABS 1.38 01/12/2024    EOSABS 0.08 01/12/2024    BASOSABS 0.04 01/12/2024     CMP:    Lab Results   Component Value Date     01/12/2024    K 4.7 01/12/2024     01/12/2024    CO2 27 01/12/2024    BUN 14 01/12/2024    CREATININE 0.89 01/12/2024    GLUCOSE 88 01/12/2024    PROT 7.9 01/12/2024    CALCIUM 9.6 01/12/2024    BILITOT 0.9 01/12/2024    ALKPHOS 221 (H) 01/12/2024    AST 36 01/12/2024    ALT 39 01/12/2024     BMP:    Lab Results   Component Value Date     01/12/2024    K 4.7 01/12/2024     01/12/2024    CO2 27 01/12/2024    BUN 14 01/12/2024    CREATININE 0.89 01/12/2024    CALCIUM 9.6 01/12/2024    GLUCOSE 88 01/12/2024     Magnesium:  Lab Results   Component Value Date    MG 1.36 (L) 09/12/2019     Troponin:  No results found for: \"TROPHS\"  BNP: No results found for: \"BNP\"      Lipid Panel:  Lab Results   Component Value Date    HDL 36.9 01/12/2024    CHHDL 4.4 01/12/2024    VLDL 23 01/12/2024    TRIG 115 01/12/2024    NHDL 127 01/12/2024        Lab work and imaging results independently reviewed by me     Visit Vitals  /75   Pulse 79   Ht 1.676 m (5' 6\")   Wt 74.8 kg (165 lb) "   SpO2 100%   BMI 26.63 kg/m²   Smoking Status Former   BSA 1.87 m²         Constitutional:       Appearance: Healthy appearance. Not in distress.   Eyes:      Conjunctiva/sclera: Conjunctivae normal.   Neck:      Vascular: JVD normal.   Pulmonary:      Effort: Pulmonary effort is normal.      Breath sounds: Normal breath sounds.   Cardiovascular:      PMI at left midclavicular line. Normal rate. Irregularly irregular rhythm. Normal S1. Normal S2.       Murmurs: There is no murmur.      No rub.   Pulses:     Intact distal pulses.   Edema:     Peripheral edema absent.   Abdominal:      General: Bowel sounds are normal.   Musculoskeletal:      Cervical back: Neck supple. Skin:     General: Skin is warm and dry.   Neurological:      Mental Status: Alert and oriented to person, place and time.           Problem List Items Addressed This Visit             ICD-10-CM    Atrial fibrillation (Multi) I48.91    Relevant Medications    apixaban (Eliquis) 2.5 mg tablet    lisinopril 20 mg tablet    metoprolol succinate XL (Toprol-XL) 100 mg 24 hr tablet    Atrial flutter (Multi) I48.92    Relevant Medications    metoprolol succinate XL (Toprol-XL) 100 mg 24 hr tablet    Chronic systolic congestive heart failure (Multi) I50.22    Relevant Medications    metoprolol succinate XL (Toprol-XL) 100 mg 24 hr tablet    spironolactone (Aldactone) 25 mg tablet    Hypertension - Primary I10    Overweight (BMI 25.0-29.9) E66.3       Guero Boykin is doing well from a CV perspective without angina or symptoms suggestive of decompensated HF     BP is in acceptable range on current RX which He is tolerating well and agrees to continue   -- Lisinopril 20 mg   --spironolactone 25 mg   --Toprol Xl 100 mg     AF is rate controlled,  Tolerating Eliquis and it is affordable     Mediterranean / DASH diet   150 minutes of symptom limited exercise / week           04/23/24 at 1:48 PM - AMISHA Collins-CNP      Orders:  No orders of the defined types  were placed in this encounter.        Followup Appts:  Future Appointments   Date Time Provider Department Center   4/25/2024  1:45 PM Margaret Barr MD [id number] [facility]   5/1/2024  7:30 PM SLEEP LAB [facility]   6/5/2024  1:30 PM AMISHA Gillespie-CNP [facility]   .

## 2024-04-23 NOTE — LETTER
April 23, 2024     Margaret Barr MD  [address] [address]    Patient: Guero Boykin   YOB: 1940   Date of Visit: 4/23/2024       Dear Dr. Margaret Barr MD:    Thank you for referring Guero Boykin to me for evaluation. Below are my notes for this consultation.  If you have questions, please do not hesitate to call me. I look forward to following your patient along with you.       Sincerely,     Anu Damian, APRN-CNP      CC: No Recipients  ______________________________________________________________________________________    Primary Care Physician: Margaret Barr MD  Primary Cardiologist:       Date of Visit: 04/23/2024  1:40 PM EDT  Location of visit:  [address]   Type of Visit: Follow up             Chief Complaint   Patient presents with   • Follow-up     Here for 1 year follow up  needs medication refills       HPI / Summary:   Guero Boykin is a 83 y.o. male  with  formerly known to Dr. Machado with HFrEF, LVEF 40%, no ischemia, permanent Afib on DOAC (Eliquis), sleep apnea, compliant with CPAP and S/P colon cancer resection in 2016.   who returns for routine follow up       Feels well without chest discomfort or unusual dyspnea.  He is compliant with CPAP.  Eliquis is affordable     12 system review is negative except as noted above         Medical History:   Past Medical History:   Diagnosis Date   • Acute kidney failure, unspecified (CMS-HCC) 01/29/2016    Acute kidney injury   • Calculus of gallbladder without cholecystitis without obstruction 03/27/2013    Cholelithiasis   • Carpal tunnel syndrome, left upper limb 02/27/2020    Carpal tunnel syndrome of left wrist   • Chest pain, unspecified 06/27/2013    Chest pain   • Diverticulosis of intestine, part unspecified, without perforation or abscess without bleeding 06/27/2013    Diverticulosis   • Gout, unspecified 12/07/2022    Gout   • Hypomagnesemia     Hypomagnesemia   • Hypomagnesemia 01/27/2017     Hypomagnesemia   • Irritable bowel syndrome without diarrhea 03/27/2013    Irritable bowel syndrome   • Liver disease, unspecified 03/27/2013    Chronic liver disease   • Other conditions influencing health status 06/27/2013    Drug-induced Cholestasis   • Other specified diseases of anus and rectum 12/06/2017    Hypertrophied anal papilla   • Other specified diseases of gallbladder 12/07/2016    Gallbladder mass   • Personal history of diseases of the skin and subcutaneous tissue 10/16/2014    History of actinic keratosis   • Personal history of other diseases of the circulatory system 05/13/2013    History of hypertension   • Personal history of other diseases of the digestive system 01/27/2017    History of hemorrhoids   • Polyp of colon 06/27/2013    Benign colonic polyp   • Syncope and collapse 03/27/2013    Fainting   • Unspecified abdominal pain 06/27/2013    Abdominal pain   • Unspecified hearing loss, left ear 11/15/2022    Decreased hearing of left ear       Social History:   Tobacco Use: Medium Risk (4/23/2024)    Patient History    • Smoking Tobacco Use: Former    • Smokeless Tobacco Use: Never    • Passive Exposure: Not on file         MEDICATIONS:   Current Outpatient Medications   Medication Instructions   • allopurinol (Zyloprim) 100 mg tablet TAKE 3 TABLETS DAILY   • apixaban (ELIQUIS) 2.5 mg, oral, 2 times daily   • cholecalciferol (VITAMIN D-3) 125 mcg, oral, Daily   • cholecalciferol, vitD3,/vit K2 (vitamin D3-vitamin K2) 125-90 mcg capsule 5,000 Units, oral, Daily   • dorzolamide (Trusopt) 2 % ophthalmic solution 1 drop, Left Eye, 2 times daily   • lisinopril 20 mg, oral, Daily   • mesalamine ER (Apriso) 0.375 gram 24 hr capsule 4 capsules, oral, Every morning, Last refill pt must make appt   • metoprolol succinate XL (TOPROL-XL) 100 mg, oral, Daily   • omeprazole (PRILOSEC) 20 mg, oral, Daily   • probenecid-colchicine 500-0.5 mg tablet 1 tablet, oral, Daily   • spironolactone (Aldactone) 25  mg tablet 0.5 tablets, oral, Daily         IMAGING REVIEWED:     Echocardiogram 4/056/2022  CONCLUSIONS:  1. The left ventricular systolic function is mildly decreased with a 50% estimated ejection fraction.  2. Spectral Doppler shows an abnormal pattern of left ventricular diastolic filling.  3. There is moderate concentric left ventricular hypertrophy.  4. The left atrium is mild to moderately dilated.  5. Mild aortic valve stenosis.  6. The estimated pulmonary artery pressure is mildly elevated with the RVSP at 41.6 mmHg.  7. The patient is in atrial fibrillation which may influence the estimate of left ventricular function and transvalvular flows.      Echocardiogram 1/28/2020  CONCLUSIONS:   1. The left ventricular systolic function is mildly decreased with a 45-50% estimated ejection fraction.   2. Slightly elevated RVSP.   3. There is mild aortic valve regurgitation.   4. There is global hypokinesis of the left ventricle with minor regional variations        LABS:  CBC with Differential:    Lab Results   Component Value Date    WBC 5.1 01/12/2024    RBC 4.09 (L) 01/12/2024    HGB 13.6 01/12/2024    HCT 41.6 01/12/2024     01/12/2024     (H) 01/12/2024    MCH 33.3 01/12/2024    MCHC 32.7 01/12/2024    RDW 13.6 01/12/2024    NRBC 0.0 01/12/2024    LYMPHOPCT 27.2 01/12/2024    MONOPCT 8.1 01/12/2024    EOSPCT 1.6 01/12/2024    BASOPCT 0.8 01/12/2024    MONOSABS 0.41 01/12/2024    LYMPHSABS 1.38 01/12/2024    EOSABS 0.08 01/12/2024    BASOSABS 0.04 01/12/2024     CMP:    Lab Results   Component Value Date     01/12/2024    K 4.7 01/12/2024     01/12/2024    CO2 27 01/12/2024    BUN 14 01/12/2024    CREATININE 0.89 01/12/2024    GLUCOSE 88 01/12/2024    PROT 7.9 01/12/2024    CALCIUM 9.6 01/12/2024    BILITOT 0.9 01/12/2024    ALKPHOS 221 (H) 01/12/2024    AST 36 01/12/2024    ALT 39 01/12/2024     BMP:    Lab Results   Component Value Date     01/12/2024    K 4.7 01/12/2024    CL  "105 01/12/2024    CO2 27 01/12/2024    BUN 14 01/12/2024    CREATININE 0.89 01/12/2024    CALCIUM 9.6 01/12/2024    GLUCOSE 88 01/12/2024     Magnesium:  Lab Results   Component Value Date    MG 1.36 (L) 09/12/2019     Troponin:  No results found for: \"TROPHS\"  BNP: No results found for: \"BNP\"      Lipid Panel:  Lab Results   Component Value Date    HDL 36.9 01/12/2024    CHHDL 4.4 01/12/2024    VLDL 23 01/12/2024    TRIG 115 01/12/2024    NHDL 127 01/12/2024        Lab work and imaging results independently reviewed by me     Visit Vitals  /75   Pulse 79   Ht 1.676 m (5' 6\")   Wt 74.8 kg (165 lb)   SpO2 100%   BMI 26.63 kg/m²   Smoking Status Former   BSA 1.87 m²         Constitutional:       Appearance: Healthy appearance. Not in distress.   Eyes:      Conjunctiva/sclera: Conjunctivae normal.   Neck:      Vascular: JVD normal.   Pulmonary:      Effort: Pulmonary effort is normal.      Breath sounds: Normal breath sounds.   Cardiovascular:      PMI at left midclavicular line. Normal rate. Irregularly irregular rhythm. Normal S1. Normal S2.       Murmurs: There is no murmur.      No rub.   Pulses:     Intact distal pulses.   Edema:     Peripheral edema absent.   Abdominal:      General: Bowel sounds are normal.   Musculoskeletal:      Cervical back: Neck supple. Skin:     General: Skin is warm and dry.   Neurological:      Mental Status: Alert and oriented to person, place and time.           Problem List Items Addressed This Visit             ICD-10-CM    Atrial fibrillation (Multi) I48.91    Relevant Medications    apixaban (Eliquis) 2.5 mg tablet    lisinopril 20 mg tablet    metoprolol succinate XL (Toprol-XL) 100 mg 24 hr tablet    Atrial flutter (Multi) I48.92    Relevant Medications    metoprolol succinate XL (Toprol-XL) 100 mg 24 hr tablet    Chronic systolic congestive heart failure (Multi) I50.22    Relevant Medications    metoprolol succinate XL (Toprol-XL) 100 mg 24 hr tablet    spironolactone " (Aldactone) 25 mg tablet    Hypertension - Primary I10    Overweight (BMI 25.0-29.9) E66.3       Guero Boykin is doing well from a CV perspective without angina or symptoms suggestive of decompensated HF     BP is in acceptable range on current RX which He is tolerating well and agrees to continue   -- Lisinopril 20 mg   --spironolactone 25 mg   --Toprol Xl 100 mg     AF is rate controlled,  Tolerating Eliquis and it is affordable     Mediterranean / DASH diet   150 minutes of symptom limited exercise / week           04/23/24 at 1:48 PM - BRANDON Collins      Orders:  No orders of the defined types were placed in this encounter.        Followup Appts:  Future Appointments   Date Time Provider Department Center   4/25/2024  1:45 PM Margaret Barr MD [id number] [facility]   5/1/2024  7:30 PM SLEEP LAB [facility] ROOM 2 [facility]   6/5/2024  1:30 PM BRANDON [place]   .

## 2024-04-25 ENCOUNTER — OFFICE VISIT (OUTPATIENT)
Dept: PRIMARY CARE | Facility: CLINIC | Age: 84
End: 2024-04-25
Payer: MEDICARE

## 2024-04-25 ENCOUNTER — APPOINTMENT (OUTPATIENT)
Dept: SLEEP MEDICINE | Facility: CLINIC | Age: 84
End: 2024-04-25
Payer: MEDICARE

## 2024-04-25 VITALS
HEART RATE: 79 BPM | OXYGEN SATURATION: 98 % | HEIGHT: 66 IN | DIASTOLIC BLOOD PRESSURE: 68 MMHG | SYSTOLIC BLOOD PRESSURE: 110 MMHG | WEIGHT: 163 LBS | TEMPERATURE: 96.1 F | BODY MASS INDEX: 26.2 KG/M2

## 2024-04-25 DIAGNOSIS — K21.9 GASTROESOPHAGEAL REFLUX DISEASE WITHOUT ESOPHAGITIS: ICD-10-CM

## 2024-04-25 DIAGNOSIS — I48.11 LONGSTANDING PERSISTENT ATRIAL FIBRILLATION (MULTI): ICD-10-CM

## 2024-04-25 DIAGNOSIS — M10.9 GOUT, UNSPECIFIED CAUSE, UNSPECIFIED CHRONICITY, UNSPECIFIED SITE: ICD-10-CM

## 2024-04-25 DIAGNOSIS — E55.9 VITAMIN D DEFICIENCY, UNSPECIFIED: ICD-10-CM

## 2024-04-25 DIAGNOSIS — E78.00 HYPERCHOLESTEREMIA: ICD-10-CM

## 2024-04-25 DIAGNOSIS — E55.9 VITAMIN D DEFICIENCY: ICD-10-CM

## 2024-04-25 DIAGNOSIS — Z00.00 ROUTINE GENERAL MEDICAL EXAMINATION AT HEALTH CARE FACILITY: Primary | ICD-10-CM

## 2024-04-25 DIAGNOSIS — I50.22 CHRONIC SYSTOLIC CONGESTIVE HEART FAILURE (MULTI): ICD-10-CM

## 2024-04-25 PROCEDURE — 1036F TOBACCO NON-USER: CPT | Performed by: INTERNAL MEDICINE

## 2024-04-25 PROCEDURE — 99214 OFFICE O/P EST MOD 30 MIN: CPT | Performed by: INTERNAL MEDICINE

## 2024-04-25 PROCEDURE — 1158F ADVNC CARE PLAN TLK DOCD: CPT | Performed by: INTERNAL MEDICINE

## 2024-04-25 PROCEDURE — 1170F FXNL STATUS ASSESSED: CPT | Performed by: INTERNAL MEDICINE

## 2024-04-25 PROCEDURE — 3078F DIAST BP <80 MM HG: CPT | Performed by: INTERNAL MEDICINE

## 2024-04-25 PROCEDURE — 3074F SYST BP LT 130 MM HG: CPT | Performed by: INTERNAL MEDICINE

## 2024-04-25 PROCEDURE — G0439 PPPS, SUBSEQ VISIT: HCPCS | Performed by: INTERNAL MEDICINE

## 2024-04-25 PROCEDURE — 1123F ACP DISCUSS/DSCN MKR DOCD: CPT | Performed by: INTERNAL MEDICINE

## 2024-04-25 PROCEDURE — 1159F MED LIST DOCD IN RCRD: CPT | Performed by: INTERNAL MEDICINE

## 2024-04-25 PROCEDURE — 1160F RVW MEDS BY RX/DR IN RCRD: CPT | Performed by: INTERNAL MEDICINE

## 2024-04-25 PROCEDURE — 99397 PER PM REEVAL EST PAT 65+ YR: CPT | Performed by: INTERNAL MEDICINE

## 2024-04-25 RX ORDER — SAME BUTANEDISULFONATE/BETAINE 400-600 MG
5000 POWDER IN PACKET (EA) ORAL DAILY
Qty: 90 CAPSULE | Refills: 3 | Status: SHIPPED | OUTPATIENT
Start: 2024-04-25 | End: 2024-07-24

## 2024-04-25 ASSESSMENT — ENCOUNTER SYMPTOMS
FATIGUE: 0
SINUS PAIN: 0
UNEXPECTED WEIGHT CHANGE: 0
SORE THROAT: 0
BLOOD IN STOOL: 0
HEADACHES: 0
DIFFICULTY URINATING: 0
DEPRESSION: 1
BRUISES/BLEEDS EASILY: 0
ABDOMINAL PAIN: 0
DIARRHEA: 0
FEVER: 0
PALPITATIONS: 0
DIZZINESS: 0
COUGH: 0
ARTHRALGIAS: 0
LOSS OF SENSATION IN FEET: 1
WHEEZING: 0

## 2024-04-25 ASSESSMENT — ACTIVITIES OF DAILY LIVING (ADL)
TAKING_MEDICATION: INDEPENDENT
MANAGING_FINANCES: NEEDS ASSISTANCE
DOING_HOUSEWORK: NEEDS ASSISTANCE
GROCERY_SHOPPING: INDEPENDENT
BATHING: INDEPENDENT
DRESSING: INDEPENDENT

## 2024-04-25 ASSESSMENT — PATIENT HEALTH QUESTIONNAIRE - PHQ9
1. LITTLE INTEREST OR PLEASURE IN DOING THINGS: NOT AT ALL
2. FEELING DOWN, DEPRESSED OR HOPELESS: NOT AT ALL
SUM OF ALL RESPONSES TO PHQ9 QUESTIONS 1 AND 2: 0

## 2024-04-25 NOTE — PROGRESS NOTES
Subjective   Reason for Visit: Guero Boykin is an 83 y.o. male here for a Medicare Wellness visit.     Past Medical, Surgical, and Family History reviewed and updated in chart.    Reviewed all medications by prescribing practitioner or clinical pharmacist (such as prescriptions, OTCs, herbal therapies and supplements) and documented in the medical record.    Annual Medicare physical  -Recent blood work obtained and reviewed with patient and plan of care  - Vaccinations reviewed need booster for tetanus vaccine  -Screening for colon cancer obtained history of colon cancer in remission follow-up Dr. Warren for surveillance as recommended  - Screening for depression negative  - Advance of directive reviewed    Follow-up  -Gout no recurrence patient need to discontinue allopurinol not to use it as needed  -Vitamin D deficiency need to start on vitamin D 5000 daily as recommended  -History of colon cancer in remission  --Lung nodule right lung stable continue monitoring conservatively repeat CT scan in 1 year January 2025    - Chronic constipation controlled to continue with MiraLAX as recommended improving  ---Counseled about alcohol abstinence  -Colon cancer in remission 3 years ago status post colostomy revision follow-up gastroenterology for surveillance as recommended next months Dr. Warren   -Hypomagnesemia controlled needs to follow up electrolytes today follow-up for further recommendation  -Hypertension controlled to continue with lisinopril   -paroxysmal atrial fibrillation patient on eliqis , is well controlled, we will continue with current medication.  No palpitation no leg swelling  -Obstructive sleep apnea compensated.  -Borderline-hypercholesterolemia continue low-fat diet follow-up as needed  Follow-up 3 months          Patient Care Team:  Margaret Barr MD as PCP - General  Margaret Barr MD as PCP - [employer] PCP     Review of Systems   Constitutional:  Negative for fatigue,  "fever and unexpected weight change.   HENT:  Negative for congestion, ear discharge, ear pain, mouth sores, sinus pain and sore throat.    Eyes:  Negative for visual disturbance.   Respiratory:  Negative for cough and wheezing.    Cardiovascular:  Negative for chest pain, palpitations and leg swelling.   Gastrointestinal:  Negative for abdominal pain, blood in stool and diarrhea.   Genitourinary:  Negative for difficulty urinating.   Musculoskeletal:  Negative for arthralgias.   Skin:  Negative for rash.   Neurological:  Negative for dizziness and headaches.   Hematological:  Does not bruise/bleed easily.   Psychiatric/Behavioral:  Negative for behavioral problems.    All other systems reviewed and are negative.      Objective   Vitals:  /68   Pulse 79   Temp 35.6 °C (96.1 °F)   Ht 1.676 m (5' 6\")   Wt 73.9 kg (163 lb)   SpO2 98%   BMI 26.31 kg/m²       Physical Exam  Vitals and nursing note reviewed.   Constitutional:       Appearance: Normal appearance.   HENT:      Head: Normocephalic.      Nose: Nose normal.   Eyes:      Conjunctiva/sclera: Conjunctivae normal.      Pupils: Pupils are equal, round, and reactive to light.   Cardiovascular:      Rate and Rhythm: Regular rhythm.   Pulmonary:      Effort: Pulmonary effort is normal.      Breath sounds: Normal breath sounds.   Abdominal:      General: Abdomen is flat.      Palpations: Abdomen is soft.   Musculoskeletal:      Cervical back: Neck supple.   Skin:     General: Skin is warm.   Neurological:      General: No focal deficit present.      Mental Status: He is oriented to person, place, and time.   Psychiatric:         Mood and Affect: Mood normal.         Assessment/Plan   Problem List Items Addressed This Visit       Atrial fibrillation (Multi)    Chronic systolic congestive heart failure (Multi)    GERD (gastroesophageal reflux disease)    Gout     Other Visit Diagnoses       Routine general medical examination at health care facility    -  " Primary    Relevant Medications    diphth,pertus,acell,,tetanus (BoostRIX) 2.5-8-5 Lf-mcg-Lf/0.5mL injection    Vitamin D deficiency        Relevant Medications    cholecalciferol, vitD3,/vit K2 (vitamin D3-vitamin K2) 125-90 mcg capsule    Hypercholesteremia        Vitamin D deficiency, unspecified              Annual Medicare physical  -Recent blood work obtained and reviewed with patient and plan of care  - Vaccinations reviewed need booster for tetanus vaccine  -Screening for colon cancer obtained history of colon cancer in remission follow-up Dr. Warren for surveillance as recommended  - Screening for depression negative  - Advance of directive reviewed    Follow-up  -Gout no recurrence patient need to discontinue allopurinol not to use it as needed  -Vitamin D deficiency need to start on vitamin D 5000 daily as recommended  -History of colon cancer in remission  --Lung nodule right lung stable continue monitoring conservatively repeat CT scan in 1 year January 2025    - Chronic constipation controlled to continue with MiraLAX as recommended improving  ---Counseled about alcohol abstinence  -Colon cancer in remission 3 years ago status post colostomy revision follow-up gastroenterology for surveillance as recommended next months Dr. Warren   -Hypomagnesemia controlled needs to follow up electrolytes today follow-up for further recommendation  -Hypertension controlled to continue with lisinopril   -paroxysmal atrial fibrillation patient on eliqis , is well controlled, we will continue with current medication.  No palpitation no leg swelling  -Obstructive sleep apnea compensated.  -Borderline-hypercholesterolemia continue low-fat diet follow-up as needed  Follow-up 3 months

## 2024-04-25 NOTE — PROGRESS NOTES
"Subjective   Patient ID: Guero Boykin is a 83 y.o. male who presents for Medicare Annual Wellness Visit Subsequent.    HPI       Review of Systems    Objective   No results found for: \"HGBA1C\"   /68   Pulse 79   Temp 35.6 °C (96.1 °F)   Ht 1.676 m (5' 6\")   Wt 73.9 kg (163 lb)   SpO2 98%   BMI 26.31 kg/m²     Physical Exam    Assessment/Plan   Guero was seen today for medicare annual wellness visit subsequent.  Diagnoses and all orders for this visit:  Gout, unspecified cause, unspecified chronicity, unspecified site  Gastroesophageal reflux disease without esophagitis  Vitamin D deficiency  Other orders  -     Follow Up In Primary Care - Established     "

## 2024-05-01 ENCOUNTER — CLINICAL SUPPORT (OUTPATIENT)
Dept: SLEEP MEDICINE | Facility: CLINIC | Age: 84
End: 2024-05-01
Payer: MEDICARE

## 2024-05-01 DIAGNOSIS — I49.3 VENTRICULAR PREMATURE DEPOLARIZATION: ICD-10-CM

## 2024-05-01 DIAGNOSIS — G47.61 PERIODIC LIMB MOVEMENT DISORDER: ICD-10-CM

## 2024-05-01 DIAGNOSIS — Z99.89 CONTINUOUS POSITIVE AIRWAY PRESSURE DEPENDENCE: ICD-10-CM

## 2024-05-01 DIAGNOSIS — I50.22 CHRONIC SYSTOLIC CONGESTIVE HEART FAILURE (MULTI): ICD-10-CM

## 2024-05-01 DIAGNOSIS — G47.33 OSA (OBSTRUCTIVE SLEEP APNEA): ICD-10-CM

## 2024-05-01 PROCEDURE — 95810 POLYSOM 6/> YRS 4/> PARAM: CPT | Performed by: PSYCHIATRY & NEUROLOGY

## 2024-05-02 NOTE — PROGRESS NOTES
[facility] TECH NOTE:     Patient: Guero Boykin   MRN//AGE: [id number]  1940  83 y.o.   Technologist: TUNDE Moralez   Room: 108   Service Date: 2024        Sleep Testing Location: [facility]:     TECHNOLOGIST SLEEP STUDY PROCEDURE NOTE:   This sleep study is being conducted according to the policies and procedures outlined by the AAS accreditation standards.  The sleep study procedure and processes involved during this appointment was explained to the patient/patient’s family, questions were answered. The patient/family verbalized understanding.      The patient is a 83 y.o. year old male scheduled for a Diagnostic PSG Split night with montage of:  diagnostic . he arrived for his appointment.      The study that was ultimately completed was a diagnostic PSG .    The full study Was completed.  Patient questionnaires completed?: yes     Consents signed? yes    Initial Fall Risk Screening:     Guero has not fallen in the last 6 months. Guero does have a fear of falling. He does need assistance with sitting, standing, or walking. he does need assistance walking in his home. he does need assistance in an unfamiliar setting. The patient isusing an assistive device.     Brief Study observations: Difficulty falling asleep. Positional GIA observed. Criteria for PAP met but too late into study to split the study.     Deviation to order/protocol and reason: no      If PAP, which was preferred mask/pressure/mode: n/a      Other:None    After the procedure, the patient/family was informed to ensure followup with ordering clinician for testing results.      Technologist: TUNDE Moralez

## 2024-05-15 DIAGNOSIS — G47.33 OSA (OBSTRUCTIVE SLEEP APNEA): Primary | ICD-10-CM

## 2024-05-15 NOTE — PROGRESS NOTES
Reviewed sleep study results, still has GIA. Plan to order new CPAP machine. New CPAP machine ordered today through [facility]. Patient to be notified and schedule follow-up after getting new CPAP machine.

## 2024-06-05 ENCOUNTER — APPOINTMENT (OUTPATIENT)
Dept: SLEEP MEDICINE | Facility: CLINIC | Age: 84
End: 2024-06-05
Payer: MEDICARE

## 2024-06-12 DIAGNOSIS — K51.90 ULCERATIVE COLITIS, UNSPECIFIED, WITHOUT COMPLICATIONS (MULTI): ICD-10-CM

## 2024-06-12 RX ORDER — MESALAMINE 0.38 G/1
CAPSULE, EXTENDED RELEASE ORAL
Qty: 360 CAPSULE | Refills: 2 | Status: SHIPPED | OUTPATIENT
Start: 2024-06-12

## 2024-07-11 ENCOUNTER — APPOINTMENT (OUTPATIENT)
Dept: SLEEP MEDICINE | Facility: CLINIC | Age: 84
End: 2024-07-11
Payer: MEDICARE

## 2024-07-11 VITALS
WEIGHT: 157 LBS | SYSTOLIC BLOOD PRESSURE: 115 MMHG | BODY MASS INDEX: 25.34 KG/M2 | OXYGEN SATURATION: 97 % | HEART RATE: 67 BPM | DIASTOLIC BLOOD PRESSURE: 64 MMHG

## 2024-07-11 DIAGNOSIS — Z99.89 CONTINUOUS POSITIVE AIRWAY PRESSURE DEPENDENCE: ICD-10-CM

## 2024-07-11 DIAGNOSIS — G47.9 SLEEP DISTURBANCE: ICD-10-CM

## 2024-07-11 DIAGNOSIS — Z72.821 INADEQUATE SLEEP HYGIENE: ICD-10-CM

## 2024-07-11 DIAGNOSIS — R53.83 FATIGUE, UNSPECIFIED TYPE: ICD-10-CM

## 2024-07-11 DIAGNOSIS — Z87.891 FORMER SMOKER: ICD-10-CM

## 2024-07-11 DIAGNOSIS — E66.3 OVERWEIGHT (BMI 25.0-29.9): ICD-10-CM

## 2024-07-11 DIAGNOSIS — G47.33 OSA (OBSTRUCTIVE SLEEP APNEA): Primary | ICD-10-CM

## 2024-07-11 DIAGNOSIS — I10 PRIMARY HYPERTENSION: ICD-10-CM

## 2024-07-11 PROCEDURE — 1159F MED LIST DOCD IN RCRD: CPT

## 2024-07-11 PROCEDURE — 99214 OFFICE O/P EST MOD 30 MIN: CPT

## 2024-07-11 PROCEDURE — 3074F SYST BP LT 130 MM HG: CPT

## 2024-07-11 PROCEDURE — 1160F RVW MEDS BY RX/DR IN RCRD: CPT

## 2024-07-11 PROCEDURE — G2211 COMPLEX E/M VISIT ADD ON: HCPCS

## 2024-07-11 PROCEDURE — 1036F TOBACCO NON-USER: CPT

## 2024-07-11 PROCEDURE — 3078F DIAST BP <80 MM HG: CPT

## 2024-07-11 ASSESSMENT — ANXIETY QUESTIONNAIRES
6. BECOMING EASILY ANNOYED OR IRRITABLE: NOT AT ALL
4. TROUBLE RELAXING: NOT AT ALL
1. FEELING NERVOUS, ANXIOUS, OR ON EDGE: NOT AT ALL
2. NOT BEING ABLE TO STOP OR CONTROL WORRYING: NOT AT ALL
3. WORRYING TOO MUCH ABOUT DIFFERENT THINGS: NOT AT ALL
GAD7 TOTAL SCORE: 0
5. BEING SO RESTLESS THAT IT IS HARD TO SIT STILL: NOT AT ALL
7. FEELING AFRAID AS IF SOMETHING AWFUL MIGHT HAPPEN: NOT AT ALL

## 2024-07-11 ASSESSMENT — SLEEP AND FATIGUE QUESTIONNAIRES
ESS-CHAD TOTAL SCORE: 8
DIFFICULTY_FALLING_ASLEEP: MILD
HOW LIKELY ARE YOU TO NOD OFF OR FALL ASLEEP IN A CAR, WHILE STOPPED FOR A FEW MINUTES IN TRAFFIC: WOULD NEVER DOZE
HOW LIKELY ARE YOU TO NOD OFF OR FALL ASLEEP WHEN YOU ARE A PASSENGER IN A CAR FOR AN HOUR WITHOUT A BREAK: HIGH CHANCE OF DOZING
SITING INACTIVE IN A PUBLIC PLACE LIKE A CLASS ROOM OR A MOVIE THEATER: SLIGHT CHANCE OF DOZING
WAKING_TOO_EARLY: MILD
HOW LIKELY ARE YOU TO NOD OFF OR FALL ASLEEP WHILE LYING DOWN TO REST IN THE AFTERNOON WHEN CIRCUMSTANCES PERMIT: MODERATE CHANCE OF DOZING
SLEEP_PROBLEM_NOTICEABLE_TO_OTHERS: A LITTLE
HOW LIKELY ARE YOU TO NOD OFF OR FALL ASLEEP WHILE WATCHING TV: SLIGHT CHANCE OF DOZING
HOW LIKELY ARE YOU TO NOD OFF OR FALL ASLEEP WHILE SITTING AND READING: SLIGHT CHANCE OF DOZING
SATISFACTION_WITH_CURRENT_SLEEP_PATTERN: SATISFIED
WORRIED_DISTRESSED_DUE_TO_SLEEP: NOT AT ALL NOTICEABLE
HOW LIKELY ARE YOU TO NOD OFF OR FALL ASLEEP WHILE SITTING AND TALKING TO SOMEONE: WOULD NEVER DOZE
HOW LIKELY ARE YOU TO NOD OFF OR FALL ASLEEP WHILE SITTING QUIETLY AFTER LUNCH WITHOUT ALCOHOL: WOULD NEVER DOZE
DIFFICULTY_STAYING_ASLEEP: MILD
SLEEP_PROBLEM_INTERFERES_DAILY_ACTIVITIES: NOT AT ALL NOTICEABLE

## 2024-07-11 ASSESSMENT — PATIENT HEALTH QUESTIONNAIRE - PHQ9
1. LITTLE INTEREST OR PLEASURE IN DOING THINGS: NOT AT ALL
SUM OF ALL RESPONSES TO PHQ9 QUESTIONS 1 AND 2: 0
2. FEELING DOWN, DEPRESSED OR HOPELESS: NOT AT ALL

## 2024-07-11 ASSESSMENT — ENCOUNTER SYMPTOMS
DEPRESSED MOOD: 0
SNORING: 0
INSOMNIA: 0
HYPERSOMNIA: 0
FATIGUE: 0
EXCESSIVE DAYTIME SLEEPINESS: 1
DIFFICULTY WITH CONCENTRATION: 0
FATIGUES EASILY: 0

## 2024-07-11 NOTE — PATIENT INSTRUCTIONS
It was a pleasure meeting you today Guero Boykin     CURRENT DME: [facility]    As we discussed today in clinic:    1. Continue with your current CPAP setting.   2. Encouraged to lose weight.   3. Remember, don't drive when sleepy.   4. Patient moving to [place] next week, Aminah Sullivan, CNP is established in [place], information given for follow-up        As a general guideline, please replace your: PAP cushions every 2-4 weeks, mask every 3-6 months, hose every 3-6 months, Filter (disposable) every 2-4 weeks; machine may need replacement in 5-10 years (once they stop working).     Please follow-up in 2 months    ALWAYS BRING YOUR CPAP / BIPAP WITH YOU TO EVERY APPOINTMENT!  THANKS    FOR QUESTIONS AND CONCERNS:   1. In case of problems with machine or mask interface, please contact your DME company first. DME is the company that provides you the machine and/or CPAP supplies. If [employer] if your DME, you can reach them at [phone] or [employer] [phone].  2. For SLEEP STUDY appointments, please call [phone] ([employer]) or [phone] / [phone] ([facility]) or any other  Sleep Lab location please call [phone]  3. For MEDICAL QUESTIONS, MEDICATION REFILLS, or CLINIC APPOINTMENT SCHEDULING, please call [phone] and my practice lead Shantel would gladly assist you with any concerns.     Here at [facility], we wish you a restful sleep!

## 2024-07-11 NOTE — PROGRESS NOTES
" Patient: Guero Boykin  : 1940 AGE: 83 y.o. SEX:male   MRN: [id number]   Provider: AMISHA [facility]     Location [facility]   Service Date: 2024     PCP: Margaret Barr MD   Referred by:               [facility]  FOLLOW-UP VISIT NOTE        HISTORY OF PRESENT ILLNESS     Patient ID: Guero Boykin is a 83 y.o. male who presents to a [facility] for follow-up GIA new PAP therapy.    Patient is here accompanied by daughter-in-law who adds to history.  The patient has pertinent hx of GIA, inadequate sleep hygiene, EDS, fatigue, overweight, HTN, Afib, CHF, former smoker, chronic sinusitis, GERD, IBS, ulcerative colitis,colon cancer, anemia, alcohol dependence, back pain, arthritis, and chronic pain.     Previous Visit's:  24  Patient here today for evaluation for new CPAP machine. Patient has been on CPAP for many years. Recently his machine started given the error message \"motor life exceed...\". Patient has not had sleep study in over 10 years. I discussed the process for a new CPAP machine, which including new sleep study. We discussed different testing option HSAT vs In-lab. Given long-standing hx of GIA, well controlled on PAP, it is reasonable to complete a HSAT one night off of CPAP for requirement for new CPAP machine. He is agreeable to this plan.   He reports no issues with his current machine or mask. I reviewed the data from his machine. He is using nightly, used 30/30 nights for 5.13 hrs on average with residual AHI of 2/hr. Current CPAP setting are 5 - 15 cmH20 EPR 3.   Patient does report daytime sleepiness and fatigue, this could be d/t his cardiac issues as he is very limited in his activities. I encourage the patient when napping during the day to utilize his CPAP as well.  Patient does drink alcohol daily, he does sometimes drink before bed. He has long-standing hx of alcohol " dependence. Encouraged patient to not drink past dinner time.   BP was WNL today. Denied any recent episode of Afib or exacerbation of CHF. Encouraged treatment of GIA with PAP to reduce risk of exacerbation of these chronic conditions.  Weight remains stable, not actively trying, does monitor weight d/t HF dx.   +PHQ-2, not on medications, encouraged follow-up with PCP.       Interval History  Patient was last seen in 1/2024.     07/11/24   Patient just received his CPAP 20 days ago. He reports that he is doing well. He brought his CPAP with him today. I reviewed data from machine as well as download. He has used 19/20 days for average usage of 8 hrs with residual AHI of 2.6/hr. After review of data, I did decrease the pressure on his machine to 4 - 12 cmH20 EPR 3 for better comfort. Patient denies any issues with mask, pressure, leak or machine. I encouraged him to continue to use CPAP nightly.   He does report today that he will be moving to assisted living facility closer to his daughters in [place]. I gave the information to follow-up with Aminah Sullivan CNP who has a clinic in  [place] to follow-up with for his CPAP machine. He will need to be seen within the next 2 months or so.   Weight is stable. BP is WNL, Negative MH screens      SLEEP HISTORY     SLEEP STUDY HISTORY  PSG - 5/1/2024  BMI - 26.6  TRT - 443 mins (7.4 hrs)  TST - 266 mins (4.4 hrs)  SE - 60%  RDI3% - 30.5/hr  RDI4% - 17.8/hr  SCHUYLER - 0.5/hr  SpO2 oliver - 88%   <88% = 0 mins  PLM Index - 16.7/hr with 0% associated with arousal    Sleep Stages:  Wake - 177 mins    N1 - 43 mins  (16.2%)  N2 - 191 mins  (71.8%)  N3 - 0 mins  (0%)  REM - 32 mins  (1%)      SLEEP-WAKE SCHEDULE  Bedtime: 10 pm  Wake time: 8 am    SLEEP HABITS   Smoking: former  ETOH:  YES - rarely  Marijuana: DENIED  Caffeine: NO  Sleep aids: Melatonin PRN     WEIGHT: stable    REVIEW OF SYSTEMS     REVIEW OF SYSTEMS  SLEEP ROS   Review of Systems   Constitutional:  Negative for  fatigue.   Respiratory:  Negative for snoring.    Neurological:  Positive for excessive daytime sleepiness. Negative for difficulty with concentration.   Psychiatric/Behavioral:  The patient does not have insomnia.        Psych Review of Symptoms:    Anxiety:   Generalized Anxiety Symptoms: No difficulty controlling worry, no excessive worry, no difficulty with concentration, does not fatigues easily, no physiological symptoms of anxiety and no sleep disturbances due to anxiety.      Depressive Symptoms:   No depressed mood, no decreased interest, no fatigue, no hypersomnia, not irritable and no insomnia.      Sleep Concerns:   Excessive daytime sleepiness. No difficulty staying asleep, no awakening from sleep, no difficulty falling asleep and no snoring.             All other systems have been reviewed and are negative.      ALLERGIES     No Known Allergies    MEDICATIONS     Current Outpatient Medications   Medication Sig Dispense Refill    apixaban (Eliquis) 2.5 mg tablet Take 1 tablet (2.5 mg) by mouth 2 times a day. 180 tablet 3    cholecalciferol (Vitamin D-3) 125 MCG (5000 UT) capsule Take 1 capsule (125 mcg) by mouth once daily.      cholecalciferol, vitD3,/vit K2 (vitamin D3-vitamin K2) 125-90 mcg capsule Take 5,000 Units by mouth once daily. 90 capsule 3    dorzolamide (Trusopt) 2 % ophthalmic solution Administer 1 drop into the left eye 2 times a day.      lisinopril 20 mg tablet Take 1 tablet (20 mg) by mouth once daily. 90 tablet 3    mesalamine ER (Apriso) 0.375 gram 24 hr capsule TAKE 4 CAPSULES BY MOUTH DAILY IN THE MORNING 360 capsule 2    metoprolol succinate XL (Toprol-XL) 100 mg 24 hr tablet Take 1 tablet (100 mg) by mouth once daily. 90 tablet 3    omeprazole (PriLOSEC) 20 mg DR capsule Take 1 capsule (20 mg) by mouth once daily. 90 capsule 1    probenecid-colchicine 500-0.5 mg tablet Take 1 tablet by mouth once daily. 90 tablet 1    spironolactone (Aldactone) 25 mg tablet Take 0.5 tablets (12.5  "mg) by mouth once daily. 45 tablet 3     No current facility-administered medications for this visit.       PAST HISTORY     PERTINENT PAST MEDICAL HISTORY: See HPI    PERTINENT PAST SURGICAL HISTORY for Sleep Medicine:  tonsillectomy    PERTINENT FAMILY HISTORY for Sleep Medicine:  Patient denies family history of any sleep disorder.  Patient denies family history of sleep apnea.    PERTINENT SOCIAL HISTORY:  He  reports that he quit smoking about 54 years ago. His smoking use included cigarettes. He started smoking about 69 years ago. He has a 15 pack-year smoking history. He has quit using smokeless tobacco. He reports current alcohol use of about 6.0 standard drinks of alcohol per week. He reports that he does not use drugs. He currently lives alone and retired    Active Problems, Allergy List, Medication List, and PMH/PSH/FH/Social Hx have been reviewed and reconciled in chart. No significant changes unless documented in the pertinent chart section. Updates made when necessary.     PHYSICAL EXAM     VITAL SIGNS: /64   Pulse 67   Wt 71.2 kg (157 lb)   SpO2 97%   BMI 25.34 kg/m²     CURRENT WEIGHT:   Vitals:    07/11/24 1433   Weight: 71.2 kg (157 lb)      BMI: Body mass index is 25.34 kg/m².     PREVIOUS WEIGHTS:  Wt Readings from Last 3 Encounters:   07/11/24 71.2 kg (157 lb)   04/25/24 73.9 kg (163 lb)   04/23/24 74.8 kg (165 lb)       Today ESS: 8  Today GEOFFREY: 6  Today KENDRA-7: 0   Today PHQ-2: NEGATIVE SCREEN    PHYSICAL EXAMINATION  General appearance: awake alert in NAD  Affect: normal  Skin: no rash noted to face  HEENT: Nasal congestion absent  Teeth: normal dentition  Lungs: no cough.  Extr: moves all four extremities  Neuro: normal speech        RESULTS/DATA     No results found for: \"IRON\", \"TRANSFERRIN\", \"IRONSAT\", \"TIBC\", \"FERRITIN\"    Bicarbonate   Date Value Ref Range Status   01/12/2024 27 21 - 32 mmol/L Final       PAP Adherence  A PAP adherence download was obtained and data was reviewed " personally today in clinic.    ASSESSMENT/PLAN     Assessment/Plan   Guero Boykin is a 83 y.o. male presents today in [facility] with the following problems:    CURRENT DME: [facility]    OBSTRUCTIVE SLEEP APNEA, moderate - severe (PSG AHI: 17.8/hr)  s/p tonsillectomy  currently on auto-CPAP 5 - 15 cmH20 EPR 3  Excellent compliance to PAP therapy, residual AHI at goal, and good control of GIA symptoms  - Patient's risk factors for GIA: age, gender, HTN, Afib, CHF, use of ETOH, and narrow crowded upper airway anatomy  - GIA diagnosed by PSG in 5/2024. Reviewed sleep studies today in clinic. See HPI.  - Retrieved and personally reviewed recent PAP adherence download data today. See HPI.   - - used 19/20 days with 19/20 days >4 hrs, for an average of 8 hrs   mins with residual AHI of 2.6/hr  - Changed PAP settings to 4 - 12 cmH20 EPR 3      INADEQUATE SLEEP HYGIENE / EXCESSIVE DAYTIME SLEEPINESS (EDS) / FATIGUE  + SLEEP DISTURBANCES  - due to combination of inadequate sleep hygiene, depression, sleep apnea, chronic pain, nocturia, alcohol use, and marijuana use. Meds may be a contributing factor as well.  - discussed with patient good sleep hygiene  07/11/24  - no significant changes noted today      OVERWEIGHT  - BMI today Body mass index is 25.34 kg/m².   - no significant weight changes noted or reported  - Encouraged patient to lose weight with diet and exercise.   - Weight loss can help in the long term treatment of GIA.  - defer management to PCP       HYPERTENSION  - BP today 115/64  - well controlled with medication, denies any issues with management    - encouraged daily exercise with healthy diet for BP and GIA management  - Defer management to PCP    DEPRESSION / ANXIETY screen  - NEGATIVE SCREEN today 07/11/24      SMOKING STATUS screen  - former smoker     All of patient's questions were answered. He verbalizes understanding and agreement with my assessment and plan.

## 2024-07-12 DIAGNOSIS — T56.0X1D LEAD-INDUCED ACUTE GOUT, UNSPECIFIED SITE, SUBSEQUENT ENCOUNTER: ICD-10-CM

## 2024-07-12 DIAGNOSIS — M10.10 LEAD-INDUCED ACUTE GOUT, UNSPECIFIED SITE, SUBSEQUENT ENCOUNTER: ICD-10-CM

## 2024-07-12 RX ORDER — PROBENECID AND COLCHICINE .5; 5 MG/1; MG/1
1 TABLET ORAL DAILY
Qty: 90 TABLET | Refills: 1 | Status: SHIPPED | OUTPATIENT
Start: 2024-07-12

## 2024-07-15 DIAGNOSIS — I48.91 ATRIAL FIBRILLATION, UNSPECIFIED TYPE (MULTI): ICD-10-CM

## 2024-07-15 DIAGNOSIS — I48.3 TYPICAL ATRIAL FLUTTER (MULTI): ICD-10-CM

## 2024-07-15 DIAGNOSIS — I48.11 LONGSTANDING PERSISTENT ATRIAL FIBRILLATION (MULTI): ICD-10-CM

## 2024-07-15 DIAGNOSIS — I50.22 CHRONIC SYSTOLIC CONGESTIVE HEART FAILURE (MULTI): ICD-10-CM

## 2024-07-15 RX ORDER — SPIRONOLACTONE 25 MG/1
12.5 TABLET ORAL DAILY
Qty: 45 TABLET | Refills: 3 | Status: SHIPPED | OUTPATIENT
Start: 2024-07-15 | End: 2025-07-15

## 2024-07-15 RX ORDER — LISINOPRIL 20 MG/1
20 TABLET ORAL DAILY
Qty: 90 TABLET | Refills: 3 | Status: SHIPPED | OUTPATIENT
Start: 2024-07-15

## 2024-07-15 RX ORDER — METOPROLOL SUCCINATE 100 MG/1
100 TABLET, EXTENDED RELEASE ORAL DAILY
Qty: 90 TABLET | Refills: 3 | Status: SHIPPED | OUTPATIENT
Start: 2024-07-15 | End: 2025-07-15

## 2024-07-25 ENCOUNTER — APPOINTMENT (OUTPATIENT)
Dept: PRIMARY CARE | Facility: CLINIC | Age: 84
End: 2024-07-25
Payer: MEDICARE

## 2025-04-22 ENCOUNTER — APPOINTMENT (OUTPATIENT)
Dept: CARDIOLOGY | Facility: CLINIC | Age: 85
End: 2025-04-22
Payer: MEDICARE